# Patient Record
Sex: FEMALE | Race: WHITE | NOT HISPANIC OR LATINO | Employment: UNEMPLOYED | URBAN - METROPOLITAN AREA
[De-identification: names, ages, dates, MRNs, and addresses within clinical notes are randomized per-mention and may not be internally consistent; named-entity substitution may affect disease eponyms.]

---

## 2022-05-25 ENCOUNTER — TRANSCRIBE ORDERS (OUTPATIENT)
Dept: PHYSICAL THERAPY | Facility: CLINIC | Age: 47
End: 2022-05-25

## 2022-05-25 DIAGNOSIS — Z98.1 S/P LUMBAR FUSION: Primary | ICD-10-CM

## 2022-07-05 ENCOUNTER — TREATMENT (OUTPATIENT)
Dept: PHYSICAL THERAPY | Facility: CLINIC | Age: 47
End: 2022-07-05

## 2022-07-05 DIAGNOSIS — Z98.1 S/P LUMBAR FUSION: Primary | ICD-10-CM

## 2022-07-05 PROCEDURE — 97163 PT EVAL HIGH COMPLEX 45 MIN: CPT | Performed by: PHYSICAL THERAPIST

## 2022-07-05 NOTE — PROGRESS NOTES
"Physical Therapy Initial Evaluation and Plan of Care    Patient: Bill [facility]oya[facility]   : 1975  Diagnosis/ICD-10 Code:  S/P lumbar fusion [Z98.1]  Referring practitioner: José Manuel Keen, *  Date of Initial Visit: 2022  Today's Date: 2022  Patient seen for 1 sessions           Subjective Questionnaire: 37/50 = 74%    Subjective Evaluation    Pain  Progression: worsening    Social Support  Lives in: trailer    Treatments  Previous treatment: physical therapy and home therapy  Patient Goals  Patient goals for therapy: decreased edema, decreased pain, improved balance, increased motion, increased strength, independence with ADLs/IADLs, return to sport/leisure activities and return to work  Patient goal: fishing, gardening, amusement palomino, go to store with kids, zoo trips       Pt chronic low back and leg symptoms, worse over time, \"to point I couldn't stand it.\" Has been worse since surgeries, still in major pain and has not subsided, weakness is worse.  She had surgery in  with land based therapy following. She had revision surgery 3/24/22. She has anterior/posterior fusion L5-S1, laminectomy, partial facetectomy and foraminotomy. She had home health, referred for aquatic therapy. She reports had MRI recently, shows fluid build up. MD follow up in 3 weeks. She reports prior to surgery could stand for short time and walk through grocery store. No she is limited to standing 2-3 mins with severe pain. A little better in sitting. Uses electric cart at grocery and kids help. Can drive short distances, kids take rollator in/out of car. Walking short distances with rollator, takes frequent breaks. Arms get really sore. W/c for longer distances. Legs spasm, buckling. 4 episodes of falling past year. Back hurts in center, lower back/sacrum. Bilat LE pain, whole leg, worse with standing and laying down, slightly better with elevation, hot/cold, medicine. Numbness down leg to foot, intermittent, " "frequent, worse with standing. Tingling more in R leg, whole leg, worse with standing. She has ramp into trailer. She has raised toilet set, shower chair, long handle brush, reacher. Gets help to wash her back and bottom of legs and feet, shoes and socks. Sleeps in hospital bed. Sleep is limited, stiff in AM. Sitting and laying a lot of the day due to pain, up intermittently. She reports intermittent black outs, legs give out, \"lightheaded and everything goes black.\" Has spoken with PCP and believed pain related. On 3 HTN meds due to high blood pressure.     Pain 9/10 (7/10 to 10/10) sharp, dull, burning.     Objective          Postural Observations  Seated posture: poor  Standing posture: poor    Additional Postural Observation Details  Rounded thoracic, forward head.    Standing: Flexed posture, flexed hips, knees, looking to floor, flexed trunk. Limited 2 mins with pain and onset of LE symptoms.    Sit to stand: max use of UE, slow transition to standing, unable to achieve full upright    Palpation   Left   Tenderness of the quadratus lumborum.     Right Tenderness of the quadratus lumborum.     Tenderness     Additional Tenderness Details  Incision healed no significant tenderness/hypersensitivity    Neurological Testing     Sensation     Lumbar   Left   Diminished: light touch    Right   Diminished: light touch    Active Range of Motion     Additional Active Range of Motion Details  Not tested due to post op  Unable to achieve neutral.     Hip ER functional AROM sitting: L 15, R 5 painful hip and low back    Shoulder AROM: Flex 95, abd, 95, ER each, IR low back with facial grimacing.    Strength/Myotome Testing     Left Hip   Planes of Motion   Flexion: 3- (painful back)  Abduction: 3  Adduction: 3    Right Hip   Planes of Motion   Flexion: 3- (painful back)  Abduction: 3  Adduction: 3    Left Knee   Flexion: 3- (painful knee)  Extension: 2+ (painful knee)    Right Knee   Flexion: 3- (painful weak)  Extension: " 2+ (painful, weak)    Left Ankle/Foot   Dorsiflexion: 3-  Plantar flexion: 3  Inversion: 3  Eversion: 3  Great toe extension: 3-    Right Ankle/Foot   Dorsiflexion: 3-  Plantar flexion: 3-  Inversion: 3-  Eversion: 3-  Great toe extension: 2+    Additional Strength Details  Painful, limited movement all directions. Can wiggle toes, limited ROM.          Muscle Activation     Additional Muscle Activation Details  Weak TA     General Comments     Lumbar Comments  Ambulation: slow antalgic pattern with flexed posture, rollator. Mod support through UE. Elbows significantly flexed. Short stride length, limited hip ext, limited push off. 100 ft. Stop x 2 to sit on rollator seat.    Standing: balance good with UE handhold. Fair without handhold.    Unable to examine sidelying/supine/hooklying today. Limited to sitting and standing only today due to pain and positional tolerance.       Assessment & Plan     Assessment  Impairments: abnormal coordination, abnormal gait, abnormal muscle firing, abnormal muscle tone, abnormal or restricted ROM, activity intolerance, impaired balance, impaired physical strength, lacks appropriate home exercise program, pain with function and weight-bearing intolerance  Functional Limitations: carrying objects, lifting, sleeping, walking, pulling, pushing, uncomfortable because of pain, moving in bed, sitting, standing and stooping  Assessment details: Patient presents with significant impairments, including pain; decreased mobility; impaired gait; decreased ROM and strength. Based on the above impairments and the examination, this patient has the potential to benefit from Physical Therapy. Will initiate principles of aquatic therapy to offload spine/joints, thermal effects to reduce pain, and hydrostatic pressure to facilitate exercise with transition to land as tolerated.    Prognosis: good    Goals  Plan Goals: STG's (4 weeks)  1. Tolerate 45 minutes aquatic therapy with reduction in pain.    2. Able to ambulate x 10 min in aquatic setting with improved gait pattern.  3. Able to tolerate initial HEP w/ progression.  4. Set rollator to appropriate height  5. Increase LE strength 1/2 muscle grade for sit to stand and transfers.   6. Pt to stand 10 mins prior to seated rest break.    LTG's (by d/c)  1. Pt to demonstrate hip ER AROM 50 degrees bilat for don/dof shoes  2. Patient voices readiness for discharge to independent HEP and self management of symptoms  3. Decrease overall pain 50% or more   4. Significant improvement on Oswestry for increased functional tolerance  5. Pt to walk 600 ft with least restrictive AD for shopping.   6. Pt to stand 20 mins prior to sitting rest break by D/C        Plan  Therapy options: will be seen for skilled therapy services  Planned modality interventions: electrical stimulation/Russian stimulation, cryotherapy, thermotherapy (hydrocollator packs), traction, ultrasound and dry needling  Planned therapy interventions: joint mobilization, home exercise program, gait training, functional ROM exercises, flexibility, manual therapy, neuromuscular re-education, postural training, soft tissue mobilization, spinal/joint mobilization, strengthening, stretching, therapeutic activities, abdominal trunk stabilization, body mechanics training, transfer training and balance/weight-bearing training  Other planned therapy interventions: aquatic  Frequency: 2x week  Duration in visits: 20  Treatment plan discussed with: patient          History # of Personal Factors and/or Comorbidities: HIGH (3+)  Examination of Body System(s): # of elements: HIGH (4+)  Clinical Presentation: EVOLVING  Clinical Decision Making: HIGH    Timed:         Manual Therapy:         mins  83033;     Therapeutic Exercise:         mins  41014;     Neuromuscular Williams:        mins  22497;    Therapeutic Activity:          mins  90222;     Gait Training:           mins  97561;     Ultrasound:          mins  31512;     Ionto                                  mins   12850  Self Care                            mins   17090  Aquatic                               mins 51745      Un-Timed:  Electrical Stimulation:         mins  48471 ( );  Dry Needling          mins self-pay  Traction          mins 19856  Low Eval          Mins  59488  Mod Eval    40   Mins  66482  High Eval                            Mins  51506  Re-Eval                               mins  60333        Timed Treatment:      mins   Total Treatment:     40   mins    PT SIGNATURE: Stephania Galarza, PT   DATE TREATMENT INITIATED: 7/7/2022    Initial Certification  Certification Period: 10/5/2022  I certify that the therapy services are furnished while this patient is under my care.  The services outlined above are required by this patient, and will be reviewed every 90 days.     PHYSICIAN: José Manuel Keen MD      DATE:     Please sign and return via fax to [phone].. Thank you, [facility].

## 2022-07-14 ENCOUNTER — TREATMENT (OUTPATIENT)
Dept: PHYSICAL THERAPY | Facility: CLINIC | Age: 47
End: 2022-07-14

## 2022-07-14 DIAGNOSIS — Z98.1 S/P LUMBAR FUSION: Primary | ICD-10-CM

## 2022-07-14 PROCEDURE — 97113 AQUATIC THERAPY/EXERCISES: CPT | Performed by: PHYSICAL THERAPIST

## 2022-07-14 NOTE — PROGRESS NOTES
Physical Therapy Treatment Note    VISIT#: 2     Diagnosis Plan   1. S/P lumbar fusion       Subjective   Bill Bedoya-Quintin reports: Pain 8/10. Back and R leg to toes. Pain in hip/groin when she stands. Comfortable with water.  Frustrated she still has as much pain and symptoms and difficulty walking after her surgery. Has machine similar to nustep at home.      Objective     See Exercise, Manual, and Modality Logs for complete treatment.     Pt 15 mins late accommodated  Rollator for ambulation    Chair to enter/exit    Assessment/Plan Very light intensity with rest breaks between exercise to avoid overfatigue. Pt reports no increased pain with any specific exercise but cumulatively increased to 9/10 by end of session. Some relief with noodle distraction.       Progress per Plan of Care Monitor and progress as tolerated. HEP in next 2 visits.            Timed:         Manual Therapy:         mins  60917;     Therapeutic Exercise:         mins  57219;     Neuromuscular Williams:        mins  11706;    Therapeutic Activity:          mins  28872;     Gait Training:           mins  84991;     Ultrasound:          mins  57307;    Ionto                                   mins   19101  Self Care                            mins   93377  Canalith Repos                   mins  4209  Aquatic                          40     mins 60670    Un-Timed:  Electrical Stimulation:         mins  10659 ( );  Dry Needling          mins self-pay  Traction          mins 04529  Low Eval          Mins  21068  Mod Eval          Mins  99053  High Eval                            Mins  62685  Re-Eval                               mins  77052    Timed Treatment:   40   mins   Total Treatment:     40   mins    Stephania Galarza, PT

## 2022-07-19 ENCOUNTER — TREATMENT (OUTPATIENT)
Dept: PHYSICAL THERAPY | Facility: CLINIC | Age: 47
End: 2022-07-19

## 2022-07-19 DIAGNOSIS — Z98.1 S/P LUMBAR FUSION: Primary | ICD-10-CM

## 2022-07-19 PROCEDURE — 97113 AQUATIC THERAPY/EXERCISES: CPT | Performed by: PHYSICAL THERAPIST

## 2022-07-19 NOTE — PROGRESS NOTES
Physical Therapy Treatment Note    VISIT#: 3     Diagnosis Plan   1. S/P lumbar fusion       Subjective   Tosca Nettell-Quintin reports: was tired after first visit but did ok. 7/10 back and leg.     Objective     See Exercise, Manual, and Modality Logs for complete treatment.     Patient Education: reviewed TA for HEP.  Aquatic via chair.    Assessment/Plan Decreased pain back and leg in aquatic setting slightly to 6/10 in distraction position.       Progress per Plan of Care Monitor and progress as tolerated.            Timed:         Manual Therapy:         mins  78734;     Therapeutic Exercise:         mins  21785;     Neuromuscular Williams:        mins  40727;    Therapeutic Activity:          mins  57813;     Gait Training:           mins  52094;     Ultrasound:          mins  39607;    Ionto                                   mins   24948  Self Care                            mins   61141  Canalith Repos                   mins  4209  Aquatic                          40    mins 64037    Un-Timed:  Electrical Stimulation:         mins  31534 ( );  Dry Needling          mins self-pay  Traction          mins 22736  Low Eval          Mins  59933  Mod Eval          Mins  25197  High Eval                            Mins  08144  Re-Eval                               mins  92661    Timed Treatment:   40   mins   Total Treatment:     40   mins    Stephania Galarza, PT

## 2022-07-21 ENCOUNTER — TREATMENT (OUTPATIENT)
Dept: PHYSICAL THERAPY | Facility: CLINIC | Age: 47
End: 2022-07-21

## 2022-07-21 DIAGNOSIS — Z98.1 S/P LUMBAR FUSION: Primary | ICD-10-CM

## 2022-07-21 PROCEDURE — 97113 AQUATIC THERAPY/EXERCISES: CPT | Performed by: PHYSICAL THERAPIST

## 2022-07-21 NOTE — PROGRESS NOTES
Physical Therapy Treatment Note    VISIT#: 4     Diagnosis Plan   1. S/P lumbar fusion       Subjective   Tosca Percell-Quintin reports: got a new rollator with UE platforms, makes her stand up straighter. In a little more pain today, woke up feeling a little better but did some gardening this AM, overdid it. Wants exercise for abductors.      Objective     See Exercise, Manual, and Modality Logs for complete treatment.     Aquatic via chair.    Assessment/Plan Very mild progression of reps of seated exercises. Rest break between to avoid overfatigue.      Progress per Plan of Care Gentle hip flexor/gastroc stretch next visit.            Timed:         Manual Therapy:         mins  17586;     Therapeutic Exercise:         mins  97725;     Neuromuscular Williams:        mins  50945;    Therapeutic Activity:          mins  83083;     Gait Training:           mins  49838;     Ultrasound:          mins  64283;    Ionto                                   mins   71989  Self Care                            mins   55190  Canalith Repos                   mins  4209  Aquatic                          43     mins 94352    Un-Timed:  Electrical Stimulation:         mins  58701 ( );  Dry Needling          mins self-pay  Traction          mins 26874  Low Eval          Mins  13452  Mod Eval          Mins  48362  High Eval                            Mins  43820  Re-Eval                               mins  19824    Timed Treatment:   43   mins   Total Treatment:     43   mins    Stephania Galarza, PT

## 2022-07-26 ENCOUNTER — TELEPHONE (OUTPATIENT)
Dept: PHYSICAL THERAPY | Facility: CLINIC | Age: 47
End: 2022-07-26

## 2022-07-28 ENCOUNTER — TREATMENT (OUTPATIENT)
Dept: PHYSICAL THERAPY | Facility: CLINIC | Age: 47
End: 2022-07-28

## 2022-07-28 DIAGNOSIS — Z98.1 S/P LUMBAR FUSION: Primary | ICD-10-CM

## 2022-07-28 PROCEDURE — 97113 AQUATIC THERAPY/EXERCISES: CPT | Performed by: PHYSICAL THERAPIST

## 2022-07-28 NOTE — PROGRESS NOTES
"Physical Therapy Treatment Note    VISIT#: 5     Diagnosis Plan   1. S/P lumbar fusion       Subjective   Tosca Percell-Quintin reports: 9/10, very active this AM, lots of doctors appts.    Objective     See Exercise, Manual, and Modality Logs for complete treatment.     Patient Education: avoid leaning on forearm walker, encourage upright posture. Encourage frequent short walks throughout the day, \"do a little bit rest a little bit\" to build up tolerance.     Aquatic via chair.    Assessment/Plan Pain decreased to 8/10 in aquatic setting. Mildly improved exercise tolerance today. Able to progress gait on treadmill 1 min and pt demonstrated longer stride length.      Progress per Plan of Care HEP hooklying and sitting.  2    Timed:         Manual Therapy:         mins  26215;     Therapeutic Exercise:         mins  93264;     Neuromuscular Williams:        mins  16041;    Therapeutic Activity:          mins  87448;     Gait Training:           mins  73213;     Ultrasound:          mins  94736;    Ionto                                   mins   81510  Self Care                            mins   33034  Canalith Repos                   mins  4209  Aquatic                          45     mins 64886    Un-Timed:  Electrical Stimulation:         mins  22988 ( );  Dry Needling          mins self-pay  Traction          mins 36534  Low Eval          Mins  21010  Mod Eval          Mins  94947  High Eval                            Mins  05127  Re-Eval                               mins  54249    Timed Treatment:   45   mins   Total Treatment:     45   mins    Stephania Galarza PT  "

## 2022-08-02 ENCOUNTER — TREATMENT (OUTPATIENT)
Dept: PHYSICAL THERAPY | Facility: CLINIC | Age: 47
End: 2022-08-02

## 2022-08-02 DIAGNOSIS — Z98.1 S/P LUMBAR FUSION: Primary | ICD-10-CM

## 2022-08-02 PROCEDURE — 97113 AQUATIC THERAPY/EXERCISES: CPT | Performed by: PHYSICAL THERAPIST

## 2022-08-02 NOTE — PROGRESS NOTES
Physical Therapy Treatment Note    VISIT#: 6     Diagnosis Plan   1. S/P lumbar fusion       Subjective   Tosca Percell-Quintin reports: Not a good day with the rain coming in. Able to straighten nee a little better. Trying to work on heel-toe gait.      Objective     See Exercise, Manual, and Modality Logs for complete treatment.     Aquatic via chair  Platform rollator for walking     Pt demonstrating mildly improved upright posture.    Assessment/Plan Pt reports some relief with lumbar distraction position. Progression limited by pain. Pt reports feels a little better post treatment, tired but less stiff.    Progress per Plan of Care Progress HEP.            Timed:         Manual Therapy:         mins  23179;     Therapeutic Exercise:         mins  80414;     Neuromuscular Williams:        mins  38420;    Therapeutic Activity:          mins  77104;     Gait Training:           mins  03007;     Ultrasound:          mins  62450;    Ionto                                   mins   57954  Self Care                            mins   84506  Canalith Repos                   mins  4209  Aquatic                          47     mins 21649    Un-Timed:  Electrical Stimulation:         mins  04177 ( );  Dry Needling          mins self-pay  Traction          mins 38585  Low Eval          Mins  27139  Mod Eval          Mins  49350  High Eval                            Mins  04564  Re-Eval                               mins  17894    Timed Treatment:   47   mins   Total Treatment:     47   mins    Stephania Galarza, PT

## 2022-08-04 ENCOUNTER — TREATMENT (OUTPATIENT)
Dept: PHYSICAL THERAPY | Facility: CLINIC | Age: 47
End: 2022-08-04

## 2022-08-04 DIAGNOSIS — Z98.1 S/P LUMBAR FUSION: Primary | ICD-10-CM

## 2022-08-04 PROCEDURE — 97113 AQUATIC THERAPY/EXERCISES: CPT | Performed by: PHYSICAL THERAPIST

## 2022-08-04 NOTE — PROGRESS NOTES
"Physical Therapy Treatment Note    VISIT#: 7     Diagnosis Plan   1. S/P lumbar fusion       Subjective   Tosca Nettell-Quintin reports: feeling a little better today. A little less swelling in her feet and ankles. Leg pain more localized to anterior thigh and groin. Less in lower leg and less in back of leg than she used to. She can tolerate exercise and standing a lot better in aquatic setting than out of aquatic setting at this point. She feels like she is a little bit \"bow legged and walk on side of R foot    Objective     See Exercise, Manual, and Modality Logs for complete treatment.     Aquatic via chair    Access Code: [email or web address]  URL: [email or web address]  Date: 08/04/2022  Prepared by: Stephania Galarza    Exercises  Seated Transversus Abdominis Bracing - 1 x daily - 7 x weekly - 2 sets - 10 reps - 5\" hold  Seated Long Arc Quad - 1 x daily - 7 x weekly - 2 sets - 10 reps  Seated Ankle Pumps - 1 x daily - 7 x weekly - 2 sets - 10 reps    Pt reports episode of R knee popping while turning with rolling walker exiting clinic.   Pt does ambulate on lateral border foot on R, demonstrate calcaneal inversion R>L, mild genu varus.     Assessment/Plan Pt with consistent relief with noodle distraction position. Making slow progress, very slow progression, but able to perform exercises with mild increase in ROM and less facial grimacing. Centralized more to anterior thigh and groin.      Progress per Plan of Care Monitior and progress as tolerated. Add gentle hip flexor, gentle quad stretch.             Timed:         Manual Therapy:         mins  60010;     Therapeutic Exercise:         mins  02684;     Neuromuscular Williams:        mins  02103;    Therapeutic Activity:          mins  97139;     Gait Training:           mins  69418;     Ultrasound:          mins  50699;    Ionto                                   mins   22422  Self Care                            mins   90827  Canalith Repos                   mins  " 4209  Aquatic                          45    mins 67274    Un-Timed:  Electrical Stimulation:         mins  04775 ( );  Dry Needling         mins self-pay  Traction          mins 51669  Low Eval          Mins  46439  Mod Eval          Mins  82133  High Eval                            Mins  07222  Re-Eval                               mins  68709    Timed Treatment:   45   mins   Total Treatment:     45   mins    Stephania Galarza, PT

## 2022-08-09 ENCOUNTER — TREATMENT (OUTPATIENT)
Dept: PHYSICAL THERAPY | Facility: CLINIC | Age: 47
End: 2022-08-09

## 2022-08-09 DIAGNOSIS — Z98.1 S/P LUMBAR FUSION: Primary | ICD-10-CM

## 2022-08-09 PROCEDURE — 97113 AQUATIC THERAPY/EXERCISES: CPT | Performed by: PHYSICAL THERAPIST

## 2022-08-09 NOTE — PROGRESS NOTES
Physical Therapy Treatment Note    VISIT#: 8     Diagnosis Plan   1. S/P lumbar fusion       Subjective   Bill Elke-Quintin reports: Doing fine with seated HEP. Pain 7/10 currently.     Objective     See Exercise, Manual, and Modality Logs for complete treatment.     Aquatic via chair.    1 episode audible popping R knee, pt reports bone on bone arthritis knee, going to get new xrays. Not sure if arthritis in hip, no xrays.    Assessment/Plan Added gentle quad stretch, also encourage hip ext with hip scissor exercise. Less leg burning with distraction position. Encourage longer stride length, mild hip ext which she was able to perform but limited by R anterior thigh pain at 5 min.  Decreased again with distraction.    Progress per Plan of Care            Timed:         Manual Therapy:         mins  84265;     Therapeutic Exercise:         mins  92157;     Neuromuscular Williams:        mins  11918;    Therapeutic Activity:          mins  01437;     Gait Training:           mins  55568;     Ultrasound:          mins  50915;    Ionto                                   mins   44840  Self Care                            mins   84045  Canalith Repos                   mins  4209  Aquatic                          50     mins 25489    Un-Timed:  Electrical Stimulation:         mins  39152 ( );  Dry Needling          mins self-pay  Traction          mins 06585  Low Eval          Mins  57258  Mod Eval          Mins  70972  High Eval                            Mins  16173  Re-Eval                               mins  44998    Timed Treatment:   50   mins   Total Treatment:     50   mins    Stephania Galarza, PT

## 2022-08-11 ENCOUNTER — TREATMENT (OUTPATIENT)
Dept: PHYSICAL THERAPY | Facility: CLINIC | Age: 47
End: 2022-08-11

## 2022-08-11 DIAGNOSIS — Z98.1 S/P LUMBAR FUSION: Primary | ICD-10-CM

## 2022-08-11 PROCEDURE — 97113 AQUATIC THERAPY/EXERCISES: CPT | Performed by: PHYSICAL THERAPIST

## 2022-08-11 NOTE — PROGRESS NOTES
Physical Therapy Treatment Note    VISIT#: 9     Diagnosis Plan   1. S/P lumbar fusion       Subjective   Tosca Percell-Quintin reports: 8/10 central low back, R groin and anterior thigh. Denies buttock pain. Md follow up with back surgeon in beginning of sept. Sees ortho for knee next week, getting xrays done today.      Objective     See Exercise, Manual, and Modality Logs for complete treatment.     Aquatic via chair    R hip assessed post aquatic. Hip flexor painful active hp flex sitting and painful advancing LE in supine position. Decreased pain with lumbar distraction and R LE long axis distraction. Advised pt to ice hip flexor and she plans to discuss with ortho next week if needs hip xray to assess for arthritis.    Assessment/Plan pt chose to increase reps slightly, also longer treadmill ambulation. Good carry over of long stride length.        Progress per Plan of Care Add diagonal weight shift, hip flexor stretching.             Timed:         Manual Therapy:         mins  36942;     Therapeutic Exercise:         mins  66475;     Neuromuscular Williams:        mins  11813;    Therapeutic Activity:          mins  32330;     Gait Training:           mins  05049;     Ultrasound:          mins  96878;    Ionto                                   mins   70179  Self Care                            mins   56687  Canalith Repos                   mins  4209  Aquatic                          45     mins 68431    Un-Timed:  Electrical Stimulation:         mins  44931 ( );  Dry Needling          mins self-pay  Traction          mins 14992  Low Eval          Mins  96265  Mod Eval          Mins  40723  High Eval                            Mins  83361  Re-Eval                               mins  90154    Timed Treatment:   45   mins   Total Treatment:     45   mins    Stephania Galarza, PT

## 2022-08-18 ENCOUNTER — TREATMENT (OUTPATIENT)
Dept: PHYSICAL THERAPY | Facility: CLINIC | Age: 47
End: 2022-08-18

## 2022-08-18 DIAGNOSIS — Z98.1 S/P LUMBAR FUSION: Primary | ICD-10-CM

## 2022-08-18 PROCEDURE — 97113 AQUATIC THERAPY/EXERCISES: CPT | Performed by: PHYSICAL THERAPIST

## 2022-08-18 NOTE — PROGRESS NOTES
Physical Therapy Treatment Note    VISIT#: 10     Diagnosis Plan   1. S/P lumbar fusion       Subjective   Tosca Percell-Quintin reports: 7/10 central low back, R groin and anterior thigh. Next MD visit: knees tomorrow; back MD next month. No change since surgery.    Objective     See Exercise, Manual, and Modality Logs for complete treatment.     Aquatic via chair      Assessment/Plan - intermittent breaks with vertical distraction b/w exercise to alleviate pressure on spine. Added gentle hip flexors stretch on step in deep end.       Progress per Plan of Care Add diagonal weight shifting.             Timed:         Manual Therapy:         mins  46064;     Therapeutic Exercise:         mins  14294;     Neuromuscular Williams:        mins  47807;    Therapeutic Activity:          mins  12224;     Gait Training:           mins  99715;     Ultrasound:          mins  45090;    Ionto                                   mins   34094  Self Care                            mins   99987  Canalith Repos                   mins  4209  Aquatic                          45     mins 93265    Un-Timed:  Electrical Stimulation:         mins  20788 ( );  Dry Needling          mins self-pay  Traction          mins 17775  Low Eval          Mins  15834  Mod Eval          Mins  03523  High Eval                            Mins  62272  Re-Eval                               mins  35609    Timed Treatment:   45   mins   Total Treatment:     45   mins    Bobby Mauro, PT

## 2022-08-23 ENCOUNTER — TREATMENT (OUTPATIENT)
Dept: PHYSICAL THERAPY | Facility: CLINIC | Age: 47
End: 2022-08-23

## 2022-08-23 DIAGNOSIS — Z98.1 S/P LUMBAR FUSION: Primary | ICD-10-CM

## 2022-08-23 PROCEDURE — 97113 AQUATIC THERAPY/EXERCISES: CPT | Performed by: PHYSICAL THERAPIST

## 2022-08-23 NOTE — PROGRESS NOTES
"Physical Therapy Progress Note    VISIT#: 11     Diagnosis Plan   1. S/P lumbar fusion       Subjective   Tosca Percell-Quintin reports: got knee xrays at [facility], bone on bone arthritis. Got shots at MD appt, helping some. Also got hip xrays, R shows more arthritis than L. MD wants her to continue therapy and follow up in 3 months. Doesn't want to do surgery right now so close to back surgery. Hip flexor stretch helped last visit some. Can stand ~ 3 mins with walker. Still difficutly getting shoes on, especially R side.  Getting up and down from chair easier. Walking better with better posture, more upright, less leaning on UE, able to walk a little further ~ \"from one end of the building to the other with new walker before have to sit down due to pain\" which is improvement. Back 6/10 to 10/10, today 6/10. Hip R 6-7/10.      Objective    Pt 10 mins late on arrival.   Oswestry: pt forgot to fill out before she left, has with her to bring back next visit.    See Exercise, Manual, and Modality Logs for complete treatment.     Patient Education: discussed hip flexor stretches for home.     Pt able to stand with improved upright posture.     Assessment/Plan Pt able to tolerated mild progression of exercises. Less walking time but mild increase in walking speed. Continues to make slow progress toward functional goals. Hip and knee also contributing to limited function.    1. Tolerate 45 minutes aquatic therapy with reduction in pain. - MET  2. Able to ambulate x 10 min in aquatic setting with improved gait pattern. - MET  3. Able to tolerate initial HEP w/ progression. - MET  4. Set rollator to appropriate height- MET  5. Increase LE strength 1/2 muscle grade for sit to stand and transfers. - PARTIALLY MET   6. Pt to stand 10 mins prior to seated rest break. - NOT MET    LTG's (by d/c)  1. Pt to demonstrate hip ER AROM 50 degrees bilat for don/dof shoes - NOT MET  2. Patient voices readiness for discharge to " independent HEP and self management of symptoms - NOT MET  3. Decrease overall pain 50% or more - PARTIALLY MET  4. Significant improvement on Oswestry for increased functional tolerance  5. Pt to walk 600 ft with least restrictive AD for shopping. - PARTIALLY MET  6. Pt to stand 20 mins prior to sitting rest break by D/C - NOT MET      Progress per Plan of Care Hip HEP next visit.            Timed:         Manual Therapy:         mins  72812;     Therapeutic Exercise:         mins  64687;     Neuromuscular Williams:        mins  53571;    Therapeutic Activity:          mins  62874;     Gait Training:           mins  41760;     Ultrasound:          mins  97078;    Ionto                                   mins   86910  Self Care                            mins   17606  Canalith Repos                   mins  4209  Aquatic                          43     mins 36382    Un-Timed:  Electrical Stimulation:         mins  06304 ( );  Dry Needling          mins self-pay  Traction          mins 51762  Low Eval          Mins  99736  Mod Eval          Mins  29470  High Eval                            Mins  54820  Re-Eval                               mins  34213    Timed Treatment:   43   mins   Total Treatment:     43   mins    Stephania Galarza PT

## 2022-08-25 ENCOUNTER — TREATMENT (OUTPATIENT)
Dept: PHYSICAL THERAPY | Facility: CLINIC | Age: 47
End: 2022-08-25

## 2022-08-25 DIAGNOSIS — Z98.1 S/P LUMBAR FUSION: Primary | ICD-10-CM

## 2022-08-25 PROCEDURE — 97113 AQUATIC THERAPY/EXERCISES: CPT | Performed by: PHYSICAL THERAPIST

## 2022-08-25 NOTE — PROGRESS NOTES
Physical Therapy Treatment Note    VISIT#: 12     Diagnosis Plan   1. S/P lumbar fusion       Subjective   Tosca Percell-Quintin reports: pt reports forgot to bring oswestry will bring next visit. No new issues.      Objective     See Exercise, Manual, and Modality Logs for complete treatment.     Patient Education:    Access Code: [email or web address]  URL: [email or web address]  Date: 08/25/2022  Prepared by: Stephania Galarza    Exercises  Standing Hip Flexor Stretch - 1 x daily - 7 x weekly - 3 sets - 1 reps - 20 sec hold  Bent Knee Fallouts - 1 x daily - 7 x weekly - 2 sets - 10 reps  Supine Hip External Rotation Stretch - 1 x daily - 7 x weekly - 3 sets - 1 reps - 20 sec hold  Supine Heel Slide with Strap - 1 x daily - 7 x weekly - 1 sets - 10 reps  Seated Knee Flexion Slide - 1 x daily - 7 x weekly - 3 sets - 1 reps - 20 sec hold  Supine Bridge - 1 x daily - 7 x weekly - 3 sets - 10 reps    Assessment/Plan Mild progression of treadmill ambulation time, progressed speed last visit.    Progress per Plan of Care 2 remaining visits in current auth. Hip circles next visit              Timed:         Manual Therapy:         mins  94954;     Therapeutic Exercise:         mins  38955;     Neuromuscular Williams:        mins  81286;    Therapeutic Activity:          mins  24377;     Gait Training:           mins  43249;     Ultrasound:          mins  41173;    Ionto                                   mins   18550  Self Care                            mins   77412  Canalith Repos                   mins  4209  Aquatic                          45   mins 51812    Un-Timed:  Electrical Stimulation:         mins  46667 ( );  Dry Needling          mins self-pay  Traction          mins 13413  Low Eval          Mins  77687  Mod Eval          Mins  22649  High Eval                            Mins  75737  Re-Eval                               mins  85621    Timed Treatment:   45   mins   Total Treatment:     45   mins    Stephania Galarza,  PT

## 2022-08-30 ENCOUNTER — TREATMENT (OUTPATIENT)
Dept: PHYSICAL THERAPY | Facility: CLINIC | Age: 47
End: 2022-08-30

## 2022-08-30 DIAGNOSIS — Z98.1 S/P LUMBAR FUSION: Primary | ICD-10-CM

## 2022-08-30 PROCEDURE — 97113 AQUATIC THERAPY/EXERCISES: CPT | Performed by: PHYSICAL THERAPIST

## 2022-08-30 NOTE — PROGRESS NOTES
Physical Therapy Treatment Note    VISIT#: 13     Diagnosis Plan   1. S/P lumbar fusion       Subjective   Tosca Percell-Quintin reports: no new issues. Lost HEP. Leg doesn't buckle as much but still occasionally.    Objective     Oswestry 36/50 - 72%    See Exercise, Manual, and Modality Logs for complete treatment.     Patient Education: reprinted and texted exercises.     Assessment/Plan Added diagonal weight shift. Overall less guarded movement.       Progress per Plan of Care Add soleus stretch, TA + UE next visit. Reassess next visit.          Timed:         Manual Therapy:         mins  11996;     Therapeutic Exercise:         mins  33378;     Neuromuscular Williams:        mins  56152;    Therapeutic Activity:          mins  98239;     Gait Training:           mins  91498;     Ultrasound:          mins  89109;    Ionto                                   mins   97256  Self Care                            mins   03948  Canalith Repos                   mins  4209  Aquatic                          48     mins 54518    Un-Timed:  Electrical Stimulation:         mins  63102 ( );  Dry Needling          mins self-pay  Traction          mins 35306  Low Eval          Mins  53428  Mod Eval          Mins  21923  High Eval                            Mins  42758  Re-Eval                               mins  56686    Timed Treatment:   48   mins   Total Treatment:     48   mins    Stephania Galarza, PT

## 2022-10-04 ENCOUNTER — TRANSCRIBE ORDERS (OUTPATIENT)
Dept: ADMINISTRATIVE | Facility: HOSPITAL | Age: 47
End: 2022-10-04

## 2022-10-04 DIAGNOSIS — Z02.71 ENCOUNTER FOR DISABILITY DETERMINATION: Primary | ICD-10-CM

## 2022-10-17 ENCOUNTER — DOCUMENTATION (OUTPATIENT)
Dept: PHYSICAL THERAPY | Facility: CLINIC | Age: 47
End: 2022-10-17

## 2022-10-17 ENCOUNTER — TELEPHONE (OUTPATIENT)
Dept: PHYSICAL THERAPY | Facility: CLINIC | Age: 47
End: 2022-10-17

## 2022-10-17 NOTE — TELEPHONE ENCOUNTER
Left VM with requesting pt call back to discuss status. She at last telephone conversation she requested to be on hold 1 week, never called back. Will D/C pt at this time, last visit was 8/30/22. Glad to help with exercises in the meantime, give info for [facility], or start pt back up for PT with a new MD referral when she is ready.

## 2022-10-17 NOTE — PROGRESS NOTES
Discharge Summary  Discharge Summary from Physical Therapy Report    Bill Ribeiro  [birth year]    Dates  PT visit: 7/5/22 to 8/30/22  Number of Visits: 13    Discharge Status of Patient: pt had been attending therapy pretty consistently and tolerating with very slow progression due to pain level and deconditioning. She had xrays knees and hips at orthopedic, showing bone on bone knees and R>L hip arthritis. She plans to follow up in 3 months to discuss possible surgery. Her last PT treatment was 8/30/22, with cancellations following due to other health concerns. She asked to be put on hold 1 week, she did not call to reschedule. D/C per clinic attendance policy at this time.     Goals: Partially Met    Discharge Plan: Continue with current home exercise program as instructed  Future need for rehabilitation activities  Patient to return to referring/providing physician    Comments D/C per clinic attendance policy at this time. She would benefit from skilled PT again in the future with a new MD referral when she is able to attend consistently again.    Date of Discharge 10/17/22        Stephania Galarza, PT  Physical Therapist

## 2022-10-18 ENCOUNTER — HOSPITAL ENCOUNTER (OUTPATIENT)
Dept: RESPIRATORY THERAPY | Facility: HOSPITAL | Age: 47
Discharge: HOME | End: 2022-10-18

## 2022-10-18 DIAGNOSIS — Z02.71 ENCOUNTER FOR DISABILITY DETERMINATION: ICD-10-CM

## 2022-10-18 PROCEDURE — 94010 BREATHING CAPACITY TEST: CPT

## 2024-03-05 ENCOUNTER — PRE-ADMISSION TESTING (OUTPATIENT)
Dept: PREADMISSION TESTING | Facility: HOSPITAL | Age: 49
End: 2024-03-05
Payer: MEDICAID

## 2024-03-05 ENCOUNTER — LAB (OUTPATIENT)
Dept: LAB | Facility: HOSPITAL | Age: 49
End: 2024-03-05
Payer: MEDICAID

## 2024-03-05 ENCOUNTER — HOSPITAL ENCOUNTER (OUTPATIENT)
Dept: CARDIOLOGY | Facility: HOSPITAL | Age: 49
Discharge: HOME | End: 2024-03-05
Payer: MEDICAID

## 2024-03-05 VITALS
HEIGHT: 64 IN | HEART RATE: 77 BPM | OXYGEN SATURATION: 95 % | BODY MASS INDEX: 40.29 KG/M2 | RESPIRATION RATE: 20 BRPM | SYSTOLIC BLOOD PRESSURE: 132 MMHG | DIASTOLIC BLOOD PRESSURE: 88 MMHG | TEMPERATURE: 98.4 F | WEIGHT: 236 LBS

## 2024-03-05 LAB
ABO GROUP BLD: NORMAL
ALBUMIN SERPL-MCNC: 4 G/DL (ref 3.5–5.2)
ANION GAP SERPL CALCULATED.3IONS-SCNC: 11.9 MMOL/L (ref 5–15)
BASOPHILS # BLD AUTO: 0.04 10*3/MM3 (ref 0–0.2)
BASOPHILS NFR BLD AUTO: 0.7 % (ref 0–1.5)
BLD GP AB SCN SERPL QL: NEGATIVE
BUN SERPL-MCNC: 10 MG/DL (ref 6–20)
BUN/CREAT SERPL: 10.2 (ref 7–25)
CALCIUM SPEC-SCNC: 9.1 MG/DL (ref 8.6–10.5)
CHLORIDE SERPL-SCNC: 105 MMOL/L (ref 98–107)
CO2 SERPL-SCNC: 24.1 MMOL/L (ref 22–29)
CREAT SERPL-MCNC: 0.98 MG/DL (ref 0.57–1)
DEPRECATED RDW RBC AUTO: 40.7 FL (ref 37–54)
EGFRCR SERPLBLD CKD-EPI 2021: 70.9 ML/MIN/1.73
EOSINOPHIL # BLD AUTO: 0.17 10*3/MM3 (ref 0–0.4)
EOSINOPHIL NFR BLD AUTO: 3.1 % (ref 0.3–6.2)
ERYTHROCYTE [DISTWIDTH] IN BLOOD BY AUTOMATED COUNT: 13 % (ref 12.3–15.4)
GLUCOSE SERPL-MCNC: 89 MG/DL (ref 65–99)
HBA1C MFR BLD: 5.6 % (ref 4.8–5.6)
HCT VFR BLD AUTO: 38.3 % (ref 34–46.6)
HGB BLD-MCNC: 12.2 G/DL (ref 12–15.9)
IMM GRANULOCYTES # BLD AUTO: 0.03 10*3/MM3 (ref 0–0.05)
IMM GRANULOCYTES NFR BLD AUTO: 0.5 % (ref 0–0.5)
LYMPHOCYTES # BLD AUTO: 2.48 10*3/MM3 (ref 0.7–3.1)
LYMPHOCYTES NFR BLD AUTO: 45.3 % (ref 19.6–45.3)
MCH RBC QN AUTO: 27.2 PG (ref 26.6–33)
MCHC RBC AUTO-ENTMCNC: 31.9 G/DL (ref 31.5–35.7)
MCV RBC AUTO: 85.5 FL (ref 79–97)
MONOCYTES # BLD AUTO: 0.4 10*3/MM3 (ref 0.1–0.9)
MONOCYTES NFR BLD AUTO: 7.3 % (ref 5–12)
MRSA DNA SPEC QL NAA+PROBE: NORMAL
NEUTROPHILS NFR BLD AUTO: 2.35 10*3/MM3 (ref 1.7–7)
NEUTROPHILS NFR BLD AUTO: 43.1 % (ref 42.7–76)
NRBC BLD AUTO-RTO: 0 /100 WBC (ref 0–0.2)
PLATELET # BLD AUTO: 234 10*3/MM3 (ref 140–450)
PMV BLD AUTO: 9.7 FL (ref 6–12)
POTASSIUM SERPL-SCNC: 4 MMOL/L (ref 3.5–5.2)
RBC # BLD AUTO: 4.48 10*6/MM3 (ref 3.77–5.28)
RH BLD: POSITIVE
SODIUM SERPL-SCNC: 141 MMOL/L (ref 136–145)
T&S EXPIRATION DATE: NORMAL
WBC NRBC COR # BLD AUTO: 5.47 10*3/MM3 (ref 3.4–10.8)

## 2024-03-05 PROCEDURE — 36415 COLL VENOUS BLD VENIPUNCTURE: CPT | Performed by: ORTHOPAEDIC SURGERY

## 2024-03-05 PROCEDURE — 83036 HEMOGLOBIN GLYCOSYLATED A1C: CPT | Performed by: ORTHOPAEDIC SURGERY

## 2024-03-05 PROCEDURE — 85025 COMPLETE CBC W/AUTO DIFF WBC: CPT | Performed by: ORTHOPAEDIC SURGERY

## 2024-03-05 PROCEDURE — 86850 RBC ANTIBODY SCREEN: CPT | Performed by: ORTHOPAEDIC SURGERY

## 2024-03-05 PROCEDURE — 87641 MR-STAPH DNA AMP PROBE: CPT

## 2024-03-05 PROCEDURE — 82040 ASSAY OF SERUM ALBUMIN: CPT | Performed by: ORTHOPAEDIC SURGERY

## 2024-03-05 PROCEDURE — 86900 BLOOD TYPING SEROLOGIC ABO: CPT

## 2024-03-05 PROCEDURE — 86901 BLOOD TYPING SEROLOGIC RH(D): CPT | Performed by: ORTHOPAEDIC SURGERY

## 2024-03-05 PROCEDURE — 93005 ELECTROCARDIOGRAM TRACING: CPT | Performed by: ORTHOPAEDIC SURGERY

## 2024-03-05 PROCEDURE — 80048 BASIC METABOLIC PNL TOTAL CA: CPT | Performed by: ORTHOPAEDIC SURGERY

## 2024-03-05 PROCEDURE — 86900 BLOOD TYPING SEROLOGIC ABO: CPT | Performed by: ORTHOPAEDIC SURGERY

## 2024-03-05 PROCEDURE — 86901 BLOOD TYPING SEROLOGIC RH(D): CPT

## 2024-03-05 RX ORDER — PANTOPRAZOLE SODIUM 40 MG/1
40 TABLET, DELAYED RELEASE ORAL DAILY
COMMUNITY

## 2024-03-05 RX ORDER — LAMOTRIGINE 100 MG/1
100 TABLET ORAL DAILY
COMMUNITY

## 2024-03-05 RX ORDER — PROMETHAZINE HYDROCHLORIDE 25 MG/1
25 TABLET ORAL EVERY 6 HOURS PRN
COMMUNITY

## 2024-03-05 RX ORDER — ALBUTEROL SULFATE 90 UG/1
2 AEROSOL, METERED RESPIRATORY (INHALATION) EVERY 4 HOURS PRN
COMMUNITY

## 2024-03-05 RX ORDER — CLONIDINE 0.2 MG/24H
1 PATCH, EXTENDED RELEASE TRANSDERMAL WEEKLY
COMMUNITY

## 2024-03-05 RX ORDER — TIZANIDINE 4 MG/1
4 TABLET ORAL EVERY 8 HOURS PRN
COMMUNITY

## 2024-03-05 RX ORDER — CLONAZEPAM 0.5 MG/1
0.5 TABLET ORAL 2 TIMES DAILY PRN
COMMUNITY

## 2024-03-05 RX ORDER — HYDROCODONE BITARTRATE AND ACETAMINOPHEN 7.5; 325 MG/1; MG/1
1 TABLET ORAL EVERY 6 HOURS PRN
COMMUNITY
End: 2024-03-11 | Stop reason: HOSPADM

## 2024-03-05 RX ORDER — ESCITALOPRAM OXALATE 20 MG/1
20 TABLET ORAL DAILY
COMMUNITY

## 2024-03-05 RX ORDER — AMLODIPINE BESYLATE 10 MG/1
10 TABLET ORAL DAILY
COMMUNITY

## 2024-03-05 RX ORDER — TRAZODONE HYDROCHLORIDE 50 MG/1
50 TABLET ORAL NIGHTLY
COMMUNITY

## 2024-03-05 RX ORDER — BUPROPION HYDROCHLORIDE 150 MG/1
450 TABLET ORAL DAILY
COMMUNITY

## 2024-03-05 RX ORDER — LISINOPRIL 20 MG/1
20 TABLET ORAL DAILY
COMMUNITY

## 2024-03-05 RX ORDER — LIDOCAINE 50 MG/G
1 PATCH TOPICAL EVERY 24 HOURS
COMMUNITY

## 2024-03-05 RX ORDER — ONDANSETRON 4 MG/1
4 TABLET, FILM COATED ORAL EVERY 8 HOURS PRN
COMMUNITY

## 2024-03-05 RX ORDER — MECLIZINE HYDROCHLORIDE 25 MG/1
25 TABLET ORAL 3 TIMES DAILY PRN
COMMUNITY

## 2024-03-05 RX ORDER — ERGOCALCIFEROL (VITAMIN D2) 10 MCG
400 TABLET ORAL DAILY
COMMUNITY

## 2024-03-05 RX ORDER — SUMATRIPTAN 50 MG/1
50 TABLET, FILM COATED ORAL
COMMUNITY

## 2024-03-05 RX ORDER — ROSUVASTATIN CALCIUM 20 MG/1
20 TABLET, COATED ORAL DAILY
COMMUNITY

## 2024-03-05 RX ORDER — GABAPENTIN 400 MG/1
400 CAPSULE ORAL 3 TIMES DAILY
COMMUNITY

## 2024-03-07 LAB
QT INTERVAL: 382 MS
QTC INTERVAL: 437 MS

## 2024-03-11 ENCOUNTER — ANESTHESIA EVENT (OUTPATIENT)
Dept: PERIOP | Facility: HOSPITAL | Age: 49
End: 2024-03-11
Payer: MEDICAID

## 2024-03-11 ENCOUNTER — ANESTHESIA (OUTPATIENT)
Dept: PERIOP | Facility: HOSPITAL | Age: 49
End: 2024-03-11
Payer: MEDICAID

## 2024-03-11 ENCOUNTER — APPOINTMENT (OUTPATIENT)
Dept: GENERAL RADIOLOGY | Facility: HOSPITAL | Age: 49
End: 2024-03-11
Payer: MEDICAID

## 2024-03-11 ENCOUNTER — HOSPITAL ENCOUNTER (OUTPATIENT)
Facility: HOSPITAL | Age: 49
Setting detail: HOSPITAL OUTPATIENT SURGERY
Discharge: HOME | End: 2024-03-11
Attending: ORTHOPAEDIC SURGERY | Admitting: ORTHOPAEDIC SURGERY
Payer: MEDICAID

## 2024-03-11 VITALS
OXYGEN SATURATION: 94 % | SYSTOLIC BLOOD PRESSURE: 122 MMHG | BODY MASS INDEX: 40.97 KG/M2 | WEIGHT: 240 LBS | RESPIRATION RATE: 13 BRPM | DIASTOLIC BLOOD PRESSURE: 85 MMHG | HEIGHT: 64 IN | HEART RATE: 98 BPM | TEMPERATURE: 97.4 F

## 2024-03-11 DIAGNOSIS — Z96.641 STATUS POST TOTAL HIP REPLACEMENT, RIGHT: Primary | ICD-10-CM

## 2024-03-11 LAB — B-HCG UR QL: NEGATIVE

## 2024-03-11 PROCEDURE — 25010000002 FENTANYL CITRATE (PF) 50 MCG/ML SOLUTION: Performed by: NURSE ANESTHETIST, CERTIFIED REGISTERED

## 2024-03-11 PROCEDURE — 25010000002 BUPIVACAINE IN DEXTROSE 0.75-8.25 % SOLUTION: Performed by: ANESTHESIOLOGY

## 2024-03-11 PROCEDURE — 94799 UNLISTED PULMONARY SVC/PX: CPT

## 2024-03-11 PROCEDURE — 94640 AIRWAY INHALATION TREATMENT: CPT

## 2024-03-11 PROCEDURE — 25010000002 DEXAMETHASONE SODIUM PHOSPHATE 20 MG/5ML SOLUTION 5 ML VIAL: Performed by: ORTHOPAEDIC SURGERY

## 2024-03-11 PROCEDURE — 25010000002 HYDROMORPHONE 1 MG/ML SOLUTION: Performed by: NURSE ANESTHETIST, CERTIFIED REGISTERED

## 2024-03-11 PROCEDURE — 25010000002 PROPOFOL 1000 MG/100ML EMULSION: Performed by: NURSE ANESTHETIST, CERTIFIED REGISTERED

## 2024-03-11 PROCEDURE — 25010000002 CEFAZOLIN PER 500 MG: Performed by: ORTHOPAEDIC SURGERY

## 2024-03-11 PROCEDURE — C1776 JOINT DEVICE (IMPLANTABLE): HCPCS | Performed by: ORTHOPAEDIC SURGERY

## 2024-03-11 PROCEDURE — 25010000002 EPINEPHRINE PER 0.1 MG: Performed by: ORTHOPAEDIC SURGERY

## 2024-03-11 PROCEDURE — 76000 FLUOROSCOPY <1 HR PHYS/QHP: CPT

## 2024-03-11 PROCEDURE — 25010000002 MAGNESIUM SULFATE PER 500 MG OF MAGNESIUM: Performed by: NURSE ANESTHETIST, CERTIFIED REGISTERED

## 2024-03-11 PROCEDURE — 25010000002 KETOROLAC TROMETHAMINE PER 15 MG: Performed by: ORTHOPAEDIC SURGERY

## 2024-03-11 PROCEDURE — 25010000002 LABETALOL 5 MG/ML SOLUTION: Performed by: NURSE ANESTHETIST, CERTIFIED REGISTERED

## 2024-03-11 PROCEDURE — 81025 URINE PREGNANCY TEST: CPT | Performed by: ORTHOPAEDIC SURGERY

## 2024-03-11 PROCEDURE — 73501 X-RAY EXAM HIP UNI 1 VIEW: CPT

## 2024-03-11 PROCEDURE — 94761 N-INVAS EAR/PLS OXIMETRY MLT: CPT

## 2024-03-11 PROCEDURE — 25010000002 HYDRALAZINE PER 20 MG: Performed by: NURSE ANESTHETIST, CERTIFIED REGISTERED

## 2024-03-11 PROCEDURE — 25010000002 FENTANYL CITRATE (PF) 100 MCG/2ML SOLUTION: Performed by: ANESTHESIOLOGY

## 2024-03-11 PROCEDURE — 25010000002 GLYCOPYRROLATE 0.2 MG/ML SOLUTION: Performed by: NURSE ANESTHETIST, CERTIFIED REGISTERED

## 2024-03-11 PROCEDURE — C1713 ANCHOR/SCREW BN/BN,TIS/BN: HCPCS | Performed by: ORTHOPAEDIC SURGERY

## 2024-03-11 PROCEDURE — 97162 PT EVAL MOD COMPLEX 30 MIN: CPT

## 2024-03-11 PROCEDURE — 25010000002 ONDANSETRON PER 1 MG: Performed by: NURSE ANESTHETIST, CERTIFIED REGISTERED

## 2024-03-11 PROCEDURE — 25810000003 LACTATED RINGERS PER 1000 ML: Performed by: ORTHOPAEDIC SURGERY

## 2024-03-11 PROCEDURE — 25010000002 ROPIVACAINE PER 1 MG: Performed by: ORTHOPAEDIC SURGERY

## 2024-03-11 PROCEDURE — 25010000002 MIDAZOLAM PER 1 MG: Performed by: NURSE ANESTHETIST, CERTIFIED REGISTERED

## 2024-03-11 DEVICE — FEMORAL HEAD Ø 28 SIZE L
Type: IMPLANTABLE DEVICE | Site: HIP | Status: FUNCTIONAL
Brand: MECTACER BIOLOX DELTA FEMORAL BALL HEAD

## 2024-03-11 DEVICE — BONE PREPARATION KIT
Type: IMPLANTABLE DEVICE | Site: HIP | Status: FUNCTIONAL
Brand: BIOPREP

## 2024-03-11 DEVICE — TOTL HIP DBL MOBL: Type: IMPLANTABLE DEVICE | Site: HIP | Status: FUNCTIONAL

## 2024-03-11 DEVICE — HC PE LINER 28 / DMD
Type: IMPLANTABLE DEVICE | Site: HIP | Status: FUNCTIONAL
Brand: DOUBLE MOBILITY LINER

## 2024-03-11 DEVICE — DOUBLE MOBILITY ACETABULAR SHELL Ø48
Type: IMPLANTABLE DEVICE | Site: HIP | Status: FUNCTIONAL
Brand: MPACT DOUBLE MOBILITY SHELLS

## 2024-03-11 DEVICE — WAX,BONE,NATURAL
Type: IMPLANTABLE DEVICE | Site: HIP | Status: FUNCTIONAL
Brand: MEDLINE INDUSTRIES

## 2024-03-11 DEVICE — AMISTEM C CEMENTED STEM STANDARD SIZE 0
Type: IMPLANTABLE DEVICE | Site: HIP | Status: FUNCTIONAL
Brand: AMISTEM C FEMORAL STEMS

## 2024-03-11 DEVICE — CMT BONE PALACOS R HI/VISC 1X40: Type: IMPLANTABLE DEVICE | Site: HIP | Status: FUNCTIONAL

## 2024-03-11 DEVICE — DEV WND/CLS CONTRL TISS STRATAFIX SYMM PDS PLS CTX 60CM VIL: Type: IMPLANTABLE DEVICE | Site: HIP | Status: FUNCTIONAL

## 2024-03-11 RX ORDER — ACETAMINOPHEN 500 MG
1000 TABLET ORAL ONCE
Status: COMPLETED | OUTPATIENT
Start: 2024-03-11 | End: 2024-03-11

## 2024-03-11 RX ORDER — OXYCODONE HYDROCHLORIDE 5 MG/1
5 TABLET ORAL ONCE AS NEEDED
Status: COMPLETED | OUTPATIENT
Start: 2024-03-11 | End: 2024-03-11

## 2024-03-11 RX ORDER — SODIUM CHLORIDE 0.9 % (FLUSH) 0.9 %
10 SYRINGE (ML) INJECTION AS NEEDED
Status: DISCONTINUED | OUTPATIENT
Start: 2024-03-11 | End: 2024-03-11 | Stop reason: HOSPADM

## 2024-03-11 RX ORDER — BUPIVACAINE HYDROCHLORIDE 7.5 MG/ML
INJECTION, SOLUTION INTRASPINAL AS NEEDED
Status: DISCONTINUED | OUTPATIENT
Start: 2024-03-11 | End: 2024-03-11 | Stop reason: SURG

## 2024-03-11 RX ORDER — DEXAMETHASONE SODIUM PHOSPHATE 4 MG/ML
20 INJECTION, SOLUTION INTRA-ARTICULAR; INTRALESIONAL; INTRAMUSCULAR; INTRAVENOUS; SOFT TISSUE ONCE
Status: DISCONTINUED | OUTPATIENT
Start: 2024-03-11 | End: 2024-03-11

## 2024-03-11 RX ORDER — DEXMEDETOMIDINE HYDROCHLORIDE 100 UG/ML
INJECTION, SOLUTION INTRAVENOUS AS NEEDED
Status: DISCONTINUED | OUTPATIENT
Start: 2024-03-11 | End: 2024-03-11 | Stop reason: SURG

## 2024-03-11 RX ORDER — HYDROCODONE BITARTRATE AND ACETAMINOPHEN 7.5; 325 MG/1; MG/1
1 TABLET ORAL EVERY 6 HOURS PRN
Qty: 28 TABLET | Refills: 0 | Status: SHIPPED | OUTPATIENT
Start: 2024-03-11

## 2024-03-11 RX ORDER — CEPHALEXIN 500 MG/1
500 CAPSULE ORAL EVERY 12 HOURS
Qty: 28 CAPSULE | Refills: 0 | Status: SHIPPED | OUTPATIENT
Start: 2024-03-11 | End: 2024-03-25

## 2024-03-11 RX ORDER — TRANEXAMIC ACID 10 MG/ML
1000 INJECTION, SOLUTION INTRAVENOUS ONCE
Status: DISCONTINUED | OUTPATIENT
Start: 2024-03-11 | End: 2024-03-11 | Stop reason: HOSPADM

## 2024-03-11 RX ORDER — IPRATROPIUM BROMIDE AND ALBUTEROL SULFATE 2.5; .5 MG/3ML; MG/3ML
3 SOLUTION RESPIRATORY (INHALATION) ONCE AS NEEDED
Status: COMPLETED | OUTPATIENT
Start: 2024-03-11 | End: 2024-03-11

## 2024-03-11 RX ORDER — FENTANYL CITRATE 50 UG/ML
50 INJECTION, SOLUTION INTRAMUSCULAR; INTRAVENOUS
Status: DISCONTINUED | OUTPATIENT
Start: 2024-03-11 | End: 2024-03-11 | Stop reason: HOSPADM

## 2024-03-11 RX ORDER — KETAMINE HCL IN NACL, ISO-OSM 100MG/10ML
SYRINGE (ML) INJECTION AS NEEDED
Status: DISCONTINUED | OUTPATIENT
Start: 2024-03-11 | End: 2024-03-11 | Stop reason: SURG

## 2024-03-11 RX ORDER — LIDOCAINE HYDROCHLORIDE 20 MG/ML
INJECTION, SOLUTION EPIDURAL; INFILTRATION; INTRACAUDAL; PERINEURAL AS NEEDED
Status: DISCONTINUED | OUTPATIENT
Start: 2024-03-11 | End: 2024-03-11 | Stop reason: SURG

## 2024-03-11 RX ORDER — LABETALOL HYDROCHLORIDE 5 MG/ML
INJECTION, SOLUTION INTRAVENOUS AS NEEDED
Status: DISCONTINUED | OUTPATIENT
Start: 2024-03-11 | End: 2024-03-11 | Stop reason: SURG

## 2024-03-11 RX ORDER — LABETALOL HYDROCHLORIDE 5 MG/ML
5 INJECTION, SOLUTION INTRAVENOUS
Status: DISCONTINUED | OUTPATIENT
Start: 2024-03-11 | End: 2024-03-11 | Stop reason: HOSPADM

## 2024-03-11 RX ORDER — GLYCOPYRROLATE 0.2 MG/ML
INJECTION INTRAMUSCULAR; INTRAVENOUS AS NEEDED
Status: DISCONTINUED | OUTPATIENT
Start: 2024-03-11 | End: 2024-03-11 | Stop reason: SURG

## 2024-03-11 RX ORDER — MAGNESIUM SULFATE HEPTAHYDRATE 500 MG/ML
INJECTION, SOLUTION INTRAMUSCULAR; INTRAVENOUS AS NEEDED
Status: DISCONTINUED | OUTPATIENT
Start: 2024-03-11 | End: 2024-03-11 | Stop reason: SURG

## 2024-03-11 RX ORDER — HYDRALAZINE HYDROCHLORIDE 20 MG/ML
5 INJECTION INTRAMUSCULAR; INTRAVENOUS
Status: DISCONTINUED | OUTPATIENT
Start: 2024-03-11 | End: 2024-03-11 | Stop reason: HOSPADM

## 2024-03-11 RX ORDER — CELECOXIB 100 MG/1
100 CAPSULE ORAL 2 TIMES DAILY
Qty: 60 CAPSULE | Refills: 0 | Status: SHIPPED | OUTPATIENT
Start: 2024-03-11 | End: 2024-03-11

## 2024-03-11 RX ORDER — HYDRALAZINE HYDROCHLORIDE 20 MG/ML
INJECTION INTRAMUSCULAR; INTRAVENOUS AS NEEDED
Status: DISCONTINUED | OUTPATIENT
Start: 2024-03-11 | End: 2024-03-11 | Stop reason: SURG

## 2024-03-11 RX ORDER — KETAMINE HCL IN NACL, ISO-OSM 100MG/10ML
SYRINGE (ML) INJECTION AS NEEDED
Status: DISCONTINUED | OUTPATIENT
Start: 2024-03-11 | End: 2024-03-11

## 2024-03-11 RX ORDER — TRANEXAMIC ACID 10 MG/ML
1000 INJECTION, SOLUTION INTRAVENOUS ONCE
Status: COMPLETED | OUTPATIENT
Start: 2024-03-11 | End: 2024-03-11

## 2024-03-11 RX ORDER — ONDANSETRON 4 MG/1
4 TABLET, ORALLY DISINTEGRATING ORAL EVERY 6 HOURS PRN
Status: CANCELLED | OUTPATIENT
Start: 2024-03-11

## 2024-03-11 RX ORDER — PROPOFOL 10 MG/ML
INJECTION, EMULSION INTRAVENOUS CONTINUOUS PRN
Status: DISCONTINUED | OUTPATIENT
Start: 2024-03-11 | End: 2024-03-11 | Stop reason: SURG

## 2024-03-11 RX ORDER — ALBUTEROL SULFATE 90 UG/1
AEROSOL, METERED RESPIRATORY (INHALATION) AS NEEDED
Status: DISCONTINUED | OUTPATIENT
Start: 2024-03-11 | End: 2024-03-11 | Stop reason: SURG

## 2024-03-11 RX ORDER — NALOXONE HCL 0.4 MG/ML
0.4 VIAL (ML) INJECTION AS NEEDED
Status: DISCONTINUED | OUTPATIENT
Start: 2024-03-11 | End: 2024-03-11 | Stop reason: HOSPADM

## 2024-03-11 RX ORDER — ONDANSETRON 2 MG/ML
4 INJECTION INTRAMUSCULAR; INTRAVENOUS EVERY 6 HOURS PRN
Status: CANCELLED | OUTPATIENT
Start: 2024-03-11

## 2024-03-11 RX ORDER — EPHEDRINE SULFATE 5 MG/ML
5 INJECTION INTRAVENOUS ONCE AS NEEDED
Status: DISCONTINUED | OUTPATIENT
Start: 2024-03-11 | End: 2024-03-11 | Stop reason: HOSPADM

## 2024-03-11 RX ORDER — FENTANYL CITRATE 50 UG/ML
INJECTION, SOLUTION INTRAMUSCULAR; INTRAVENOUS AS NEEDED
Status: DISCONTINUED | OUTPATIENT
Start: 2024-03-11 | End: 2024-03-11 | Stop reason: SURG

## 2024-03-11 RX ORDER — SODIUM CHLORIDE, SODIUM LACTATE, POTASSIUM CHLORIDE, CALCIUM CHLORIDE 600; 310; 30; 20 MG/100ML; MG/100ML; MG/100ML; MG/100ML
1000 INJECTION, SOLUTION INTRAVENOUS CONTINUOUS
Status: DISCONTINUED | OUTPATIENT
Start: 2024-03-11 | End: 2024-03-11 | Stop reason: HOSPADM

## 2024-03-11 RX ORDER — ONDANSETRON 2 MG/ML
4 INJECTION INTRAMUSCULAR; INTRAVENOUS ONCE AS NEEDED
Status: DISCONTINUED | OUTPATIENT
Start: 2024-03-11 | End: 2024-03-11 | Stop reason: HOSPADM

## 2024-03-11 RX ORDER — TRAMADOL HYDROCHLORIDE 50 MG/1
50 TABLET ORAL EVERY 6 HOURS PRN
Qty: 28 TABLET | Refills: 0 | Status: SHIPPED | OUTPATIENT
Start: 2024-03-11

## 2024-03-11 RX ORDER — EPHEDRINE SULFATE 5 MG/ML
INJECTION INTRAVENOUS AS NEEDED
Status: DISCONTINUED | OUTPATIENT
Start: 2024-03-11 | End: 2024-03-11 | Stop reason: SURG

## 2024-03-11 RX ORDER — ONDANSETRON 2 MG/ML
INJECTION INTRAMUSCULAR; INTRAVENOUS AS NEEDED
Status: DISCONTINUED | OUTPATIENT
Start: 2024-03-11 | End: 2024-03-11 | Stop reason: SURG

## 2024-03-11 RX ORDER — ACETAMINOPHEN 325 MG/1
650 TABLET ORAL ONCE AS NEEDED
Status: DISCONTINUED | OUTPATIENT
Start: 2024-03-11 | End: 2024-03-11 | Stop reason: HOSPADM

## 2024-03-11 RX ORDER — CELECOXIB 100 MG/1
100 CAPSULE ORAL 2 TIMES DAILY
Qty: 60 CAPSULE | Refills: 0 | Status: SHIPPED | OUTPATIENT
Start: 2024-03-11

## 2024-03-11 RX ORDER — MIDAZOLAM HYDROCHLORIDE 1 MG/ML
INJECTION INTRAMUSCULAR; INTRAVENOUS AS NEEDED
Status: DISCONTINUED | OUTPATIENT
Start: 2024-03-11 | End: 2024-03-11 | Stop reason: SURG

## 2024-03-11 RX ORDER — MELOXICAM 15 MG/1
15 TABLET ORAL ONCE
Status: COMPLETED | OUTPATIENT
Start: 2024-03-11 | End: 2024-03-11

## 2024-03-11 RX ORDER — ASPIRIN 81 MG/1
81 TABLET ORAL 2 TIMES DAILY
Qty: 60 TABLET | Refills: 0 | Status: SHIPPED | OUTPATIENT
Start: 2024-03-11 | End: 2024-04-10

## 2024-03-11 RX ORDER — FLUMAZENIL 0.1 MG/ML
0.2 INJECTION INTRAVENOUS AS NEEDED
Status: DISCONTINUED | OUTPATIENT
Start: 2024-03-11 | End: 2024-03-11 | Stop reason: HOSPADM

## 2024-03-11 RX ADMIN — LIDOCAINE HYDROCHLORIDE 100 MG: 20 INJECTION, SOLUTION EPIDURAL; INFILTRATION; INTRACAUDAL; PERINEURAL at 10:26

## 2024-03-11 RX ADMIN — HYDROMORPHONE HYDROCHLORIDE 0.5 MG: 1 INJECTION, SOLUTION INTRAMUSCULAR; INTRAVENOUS; SUBCUTANEOUS at 13:35

## 2024-03-11 RX ADMIN — CEFAZOLIN 2000 MG: 2 INJECTION, POWDER, FOR SOLUTION INTRAMUSCULAR; INTRAVENOUS at 10:30

## 2024-03-11 RX ADMIN — DEXMEDETOMIDINE HYDROCHLORIDE 10 MCG: 100 INJECTION, SOLUTION INTRAVENOUS at 10:15

## 2024-03-11 RX ADMIN — SODIUM CHLORIDE, POTASSIUM CHLORIDE, SODIUM LACTATE AND CALCIUM CHLORIDE 1000 ML: 600; 310; 30; 20 INJECTION, SOLUTION INTRAVENOUS at 09:19

## 2024-03-11 RX ADMIN — EPHEDRINE SULFATE 10 MG: 5 INJECTION INTRAVENOUS at 11:41

## 2024-03-11 RX ADMIN — Medication 5 MG: at 10:54

## 2024-03-11 RX ADMIN — HYDROMORPHONE HYDROCHLORIDE 0.5 MG: 1 INJECTION, SOLUTION INTRAMUSCULAR; INTRAVENOUS; SUBCUTANEOUS at 13:01

## 2024-03-11 RX ADMIN — FENTANYL CITRATE 50 MCG: 50 INJECTION, SOLUTION INTRAMUSCULAR; INTRAVENOUS at 12:44

## 2024-03-11 RX ADMIN — ALBUTEROL SULFATE 8 PUFF: 108 AEROSOL, METERED RESPIRATORY (INHALATION) at 11:22

## 2024-03-11 RX ADMIN — FENTANYL CITRATE 15 MCG: 50 INJECTION, SOLUTION INTRAMUSCULAR; INTRAVENOUS at 09:55

## 2024-03-11 RX ADMIN — TRANEXAMIC ACID 1000 MG: 10 INJECTION, SOLUTION INTRAVENOUS at 10:40

## 2024-03-11 RX ADMIN — OXYCODONE 5 MG: 5 TABLET ORAL at 13:21

## 2024-03-11 RX ADMIN — HYDRALAZINE HYDROCHLORIDE 5 MG: 20 INJECTION INTRAMUSCULAR; INTRAVENOUS at 11:06

## 2024-03-11 RX ADMIN — TRANEXAMIC ACID 1000 MG: 10 INJECTION, SOLUTION INTRAVENOUS at 11:46

## 2024-03-11 RX ADMIN — SODIUM CHLORIDE, POTASSIUM CHLORIDE, SODIUM LACTATE AND CALCIUM CHLORIDE: 600; 310; 30; 20 INJECTION, SOLUTION INTRAVENOUS at 12:05

## 2024-03-11 RX ADMIN — PROPOFOL INJECTABLE EMULSION 150 MG: 10 INJECTION, EMULSION INTRAVENOUS at 11:07

## 2024-03-11 RX ADMIN — ONDANSETRON 4 MG: 2 INJECTION INTRAMUSCULAR; INTRAVENOUS at 10:55

## 2024-03-11 RX ADMIN — FENTANYL CITRATE 85 MCG: 50 INJECTION, SOLUTION INTRAMUSCULAR; INTRAVENOUS at 10:17

## 2024-03-11 RX ADMIN — BUPIVACAINE HYDROCHLORIDE IN DEXTROSE 1.6 MG: 7.5 INJECTION, SOLUTION SUBARACHNOID at 09:55

## 2024-03-11 RX ADMIN — MIDAZOLAM 2 MG: 1 INJECTION INTRAMUSCULAR; INTRAVENOUS at 10:18

## 2024-03-11 RX ADMIN — Medication 5 MG: at 10:58

## 2024-03-11 RX ADMIN — DEXAMETHASONE SODIUM PHOSPHATE 20 MG: 4 INJECTION, SOLUTION INTRAMUSCULAR; INTRAVENOUS at 09:33

## 2024-03-11 RX ADMIN — MELOXICAM 15 MG: 15 TABLET ORAL at 09:20

## 2024-03-11 RX ADMIN — Medication 25 MG: at 10:50

## 2024-03-11 RX ADMIN — IPRATROPIUM BROMIDE AND ALBUTEROL SULFATE 3 ML: .5; 3 SOLUTION RESPIRATORY (INHALATION) at 13:00

## 2024-03-11 RX ADMIN — ACETAMINOPHEN 1000 MG: 500 TABLET, FILM COATED ORAL at 09:20

## 2024-03-11 RX ADMIN — MAGNESIUM SULFATE HEPTAHYDRATE 2 G: 500 INJECTION, SOLUTION INTRAMUSCULAR; INTRAVENOUS at 10:35

## 2024-03-11 RX ADMIN — GLYCOPYRROLATE 0.2 MG: 0.2 INJECTION INTRAMUSCULAR; INTRAVENOUS at 11:00

## 2024-03-11 RX ADMIN — ALBUTEROL SULFATE 8 PUFF: 108 AEROSOL, METERED RESPIRATORY (INHALATION) at 11:39

## 2024-03-11 RX ADMIN — PROPOFOL INJECTABLE EMULSION 75 MCG/KG/MIN: 10 INJECTION, EMULSION INTRAVENOUS at 10:26

## 2024-03-11 NOTE — ANESTHESIA PROCEDURE NOTES
Intrathecal Block    Pre-sedation assessment completed: 3/11/2024 9:48 AM    Patient reassessed immediately prior to procedure    Patient location during procedure: pre-op  Start Time: 3/11/2024 9:48 AM  Stop Time: 3/11/2024 9:58 AM  Performed By  Anesthesiologist: Neil Fernandez MD  Preanesthetic Checklist  Completed: patient identified, site marked, surgical consent, pre-op evaluation, timeout performed, IV checked, risks and benefits discussed and monitors and equipment checked  Intrathecal Block Prep:  Pt Position:sitting  Sterile Tech:sterile barrier, gloves and cap  Prep:chloraprep  Monitoring:blood pressure monitoring, continuous pulse oximetry and EKG  Intrathecal Block Procedure:  Approach:midline  Location:L4-L5  Needle Type:Quincke  Needle Gauge:22 G  Placement of Needle Event: cerebrospinal fluid  Post Assessment:  Patient Tolerance:patient tolerated the procedure well with no apparent complications  Complications:no

## 2024-03-11 NOTE — H&P
"   History & Physical       Patient: Bill Rogers    Proposed Surgery and date: Procedure(s) (LRB):  TOTAL HIP ARTHROPLASTY ANTERIOR (Right)     YOB: 1975    Medical Record Number: [id number]  Wt Readings from Last 3 Encounters:   24 107 kg (236 lb)     Ht Readings from Last 3 Encounters:   24 162.6 cm (64\")     There is no height or weight on file to calculate BMI.  No height and weight on file for this encounter.      Surgeon:  Dr. Laurent Diana M.D.        Chief Complaints: Pain    History of Present Illness: 48 y.o. female presents with right hip osteoarthritis. Onset of symptoms was years ago and has been progressively worsening despite more conservative treatment measures.  Symptoms are associated with ability to move, exercise, and perform activities of daily living.  Symptoms are aggravated by weight bearing and ROM necessary for activities of daily living.   Symptoms improve with rest, ice and elevation only minimally.      Allergies: No Known Allergies    Medications:   Home Medications:  No current facility-administered medications on file prior to encounter.     No current outpatient medications on file prior to encounter.     Current Medications:  Scheduled Meds:  Continuous Infusions:No current facility-administered medications for this encounter.    PRN Meds:.    Past Medical History:   Diagnosis Date    Anxiety     Depression     H/O:      HTN (hypertension)     Hx of laparoscopic gastric banding     Hyperlipidemia     Knee pain, bilateral     Migraines     PONV (postoperative nausea and vomiting)     Sleep apnea         Past Surgical History:   Procedure Laterality Date    BACK SURGERY      BREAST BIOPSY       SECTION      x 3    CHOLECYSTECTOMY      GASTRIC BANDING          Social History     Occupational History    Not on file   Tobacco Use    Smoking status: Never    Smokeless tobacco: Never   Substance and Sexual Activity    Alcohol use: Yes "     Comment: social    Drug use: Never    Sexual activity: Defer      Social History     Social History Narrative    Not on file      No family history on file.      Review of Systems: 14 point review of systems was performed and was negative except as documented in the history of present illness.      Physical Exam: 48 y.o. female    Vital signs reviewed.    General Appearance:    Alert, cooperative, in no acute distress                  General: alert, oriented  Eyes: conjunctiva clear  ENT: external ears and nose atraumatic  CV: no peripheral edema  Resp: normal respiratory effort  Skin: no rashes or wounds; normal turgor  Psych: mood and affect appropriate  Lymph: no nodes appreciated  Neuro: gross sensation intact  Vascular:  Palpable peripheral pulse in noted extremity  Musculoskeletal Extremities:  tenderness over operative extremity. Moves all extremities well, no to minimal LE edema, no cyanosis, no redness            Diagnostic Tests:  Lab Results (last 7 days)       Procedure Component Value Units Date/Time    Basic Metabolic Panel [[id number]]  (Normal) Collected: 03/05/24 1142    Specimen: Blood Updated: 03/05/24 1616     Glucose 89 mg/dL      BUN 10 mg/dL      Creatinine 0.98 mg/dL      Sodium 141 mmol/L      Potassium 4.0 mmol/L      Chloride 105 mmol/L      CO2 24.1 mmol/L      Calcium 9.1 mg/dL      BUN/Creatinine Ratio 10.2     Anion Gap 11.9 mmol/L      eGFR 70.9 mL/min/1.73     Narrative:      GFR Normal >60  Chronic Kidney Disease <60  Kidney Failure <15      Albumin [[id number]]  (Normal) Collected: 03/05/24 1142    Specimen: Blood Updated: 03/05/24 1616     Albumin 4.0 g/dL     Hemoglobin A1c [[id number]]  (Normal) Collected: 03/05/24 1142    Specimen: Blood Updated: 03/05/24 1613     Hemoglobin A1C 5.60 %     Narrative:      Hemoglobin A1C Ranges:    Increased Risk for Diabetes  5.7% to 6.4%  Diabetes                     >= 6.5%  Diabetic Goal                < 7.0%    CBC & Differential  [[id number]]  (Normal) Collected: 03/05/24 1142    Specimen: Blood Updated: 03/05/24 1612    Narrative:      The following orders were created for panel order CBC & Differential.  Procedure                               Abnormality         Status                     ---------                               -----------         ------                     CBC Auto Differential[[id number]]        Normal              Final result                 Please view results for these tests on the individual orders.    CBC Auto Differential [[id number]]  (Normal) Collected: 03/05/24 1142    Specimen: Blood Updated: 03/05/24 1612     WBC 5.47 10*3/mm3      RBC 4.48 10*6/mm3      Hemoglobin 12.2 g/dL      Hematocrit 38.3 %      MCV 85.5 fL      MCH 27.2 pg      MCHC 31.9 g/dL      RDW 13.0 %      RDW-SD 40.7 fl      MPV 9.7 fL      Platelets 234 10*3/mm3      Neutrophil % 43.1 %      Lymphocyte % 45.3 %      Monocyte % 7.3 %      Eosinophil % 3.1 %      Basophil % 0.7 %      Immature Grans % 0.5 %      Neutrophils, Absolute 2.35 10*3/mm3      Lymphocytes, Absolute 2.48 10*3/mm3      Monocytes, Absolute 0.40 10*3/mm3      Eosinophils, Absolute 0.17 10*3/mm3      Basophils, Absolute 0.04 10*3/mm3      Immature Grans, Absolute 0.03 10*3/mm3      nRBC 0.0 /100 WBC             Radiology images/reports reviewed      Assessment: Right hip osteoarthritis    Plan:  We will plan on proceeding with surgery at patient's request. Dr. Diana has reviewed details of procedure with patient today and discussed risks, benefits, alternatives, and limitations of the procedure in laymen's terms with the risks including but not limited to: Allergy to medication, allergy to implant, device recall, leg length discrepancy, dislocation,  need to stop the surgery early or change to a different procedure, stiffness requiring revision surgeries neurovascular damage, foot drop, bleeding, infection, chronic pain, worsening of pain, chondrolysis, recurrent problem,   swelling, loss of motion, weakness, stiffness, instability, DVT, pulmonary embolus, death, stroke, complex regional pain syndrome, and need for additional procedures.  No guarantees were given regarding results of surgery.     Patient was given the opportunity to ask and have all questions answered today.  The patient voiced understanding of the risks, benefits, and alternative forms of treatment that were discussed and the patient consents to proceed with surgery.     Discharge Plan: Home with f/u in with Dr. Diana  Date: 3/11/2024  Laurent Diana MD

## 2024-03-11 NOTE — ANESTHESIA PREPROCEDURE EVALUATION
Anesthesia Evaluation     Patient summary reviewed and Nursing notes reviewed   history of anesthetic complications:  PONV  NPO Solid Status: > 8 hours             Airway   Mallampati: II  TM distance: >3 FB  Neck ROM: full  No difficulty expected  Dental - normal exam     Pulmonary - normal exam   (+) ,sleep apnea on CPAP  (-) not a smoker  Cardiovascular - normal exam    ECG reviewed    (+) hypertension, hyperlipidemia  (-) angina, FERNÁNDEZ      Neuro/Psych- negative ROS  GI/Hepatic/Renal/Endo - negative ROS     Musculoskeletal (-) negative ROS        ROS comment: Several back surgeries  Abdominal  - normal exam    Bowel sounds: normal.   Substance History - negative use     OB/GYN negative ob/gyn ROS         Other                    Anesthesia Plan    ASA 3     spinal       Anesthetic plan, risks, benefits, and alternatives have been provided, discussed and informed consent has been obtained with: patient.    CODE STATUS:

## 2024-03-11 NOTE — THERAPY EVALUATION
[facility]  [facility] 1975    MRN: [id number]                              Today's Date: 3/11/2024       Admit Date: 3/11/2024    Visit Dx:     ICD-10-CM ICD-9-CM   1. Status post total hip replacement, right  Z96.641 V43.64     There is no problem list on file for this patient.    Past Medical History:   Diagnosis Date    Anxiety     Depression     H/O:      HTN (hypertension)     Hx of laparoscopic gastric banding     Hyperlipidemia     Knee pain, bilateral     Migraines     PONV (postoperative nausea and vomiting)     Sleep apnea      Past Surgical History:   Procedure Laterality Date    BACK SURGERY      lumbar    BREAST BIOPSY       SECTION      x 3    CHOLECYSTECTOMY      GASTRIC BANDING      TONSILLECTOMY        General Information       Row Name 24 1607          Physical Therapy Time and Intention    Document Type evaluation  -OD     Mode of Treatment physical therapy  -OD       Row Name 24 1607          General Information    Patient Profile Reviewed yes  -OD     Prior Level of Function min assist:  Pt uses rollator for mobility; min-modA for ADLs d/t pain  -OD     Existing Precautions/Restrictions fall;hip, anterior  -OD     Barriers to Rehab medically complex;previous functional deficit;ineffective coping  -OD       Row Name 24 1607          Living Environment    People in Home spouse;child(meredith), dependent  -OD       Row Name 24 1607          Home Main Entrance    Number of Stairs, Main Entrance three  -OD       Row Name 24 1607          Stairs Within Home, Primary    Number of Stairs, Within Home, Primary none  -OD       Row Name 24 1607          Cognition    Orientation Status (Cognition) oriented x 4  -OD       Row Name 24 1607          Safety Issues, Functional Mobility    Impairments Affecting Function (Mobility) balance;endurance/activity tolerance;pain;motor control;strength;sensation/sensory awareness  -OD                User Key  (r) = Recorded By, (t) = Taken By, (c) = Cosigned By      Initials Name Provider Type    OD Rin Grewal, PT Physical Therapist                   Mobility       Row Name 03/11/24 1607          Bed Mobility    Bed Mobility bed mobility (all) activities  -OD     All Activities, Lipscomb (Bed Mobility) moderate assist (50% patient effort)  -OD       Row Name 03/11/24 1607          Sit-Stand Transfer    Sit-Stand Lipscomb (Transfers) contact guard  -OD     Assistive Device (Sit-Stand Transfers) walker, front-wheeled  -OD       Row Name 03/11/24 1607          Gait/Stairs (Locomotion)    Lipscomb Level (Gait) contact guard  -OD     Assistive Device (Gait) walker, front-wheeled  -OD     Patient was able to Ambulate yes  -OD     Distance in Feet (Gait) 15 feet  -OD     Deviations/Abnormal Patterns (Gait) weight shifting decreased;stride length decreased;base of support, narrow  -OD               User Key  (r) = Recorded By, (t) = Taken By, (c) = Cosigned By      Initials Name Provider Type    OD Rin Grewal, ANASTACIA Physical Therapist                   Obj/Interventions       Row Name 03/11/24 1608          Range of Motion Comprehensive    General Range of Motion bilateral lower extremity ROM WFL  -OD       Row Name 03/11/24 1608          Strength Comprehensive (MMT)    General Manual Muscle Testing (MMT) Assessment lower extremity strength deficits identified  -OD     Comment, General Manual Muscle Testing (MMT) Assessment Pt demo weakness BLE; no formal MMT performed d/t pain  -OD       Row Name 03/11/24 1608          Motor Skills    Therapeutic Exercise hip  -OD       Row Name 03/11/24 1608          Hip (Therapeutic Exercise)    Hip (Therapeutic Exercise) other (see comments)  instructed and edu regarding post-op HEP  -OD       Row Name 03/11/24 1608          Balance    Balance Interventions sitting;standing;sit to stand;supported;minimal challenge;moderate challenge  -OD       Row Name  03/11/24 1608          Sensory Assessment (Somatosensory)    Sensory Assessment (Somatosensory) other (see comments)  ble sensation impaired after sx  -OD               User Key  (r) = Recorded By, (t) = Taken By, (c) = Cosigned By      Initials Name Provider Type    OD Rin Grewal, PT Physical Therapist                   Goals/Plan    No documentation.                  Clinical Impression       Row Name 03/11/24 1609          Pain    Pretreatment Pain Rating 7/10  -OD     Posttreatment Pain Rating 8/10  -OD     Pain Location - Side/Orientation Right  -OD     Pain Location incisional  -OD     Pain Location - hip  -OD       Row Name 03/11/24 1609          Plan of Care Review    Plan of Care Reviewed With patient;spouse  -OD     Progress no change  -OD     Outcome Evaluation Bill Hutchison is a 47 y/o F with PMH of lumbar fusion and chronic hip pain was admitted to [facility] for R anterior ANALILIA with Dr. Diana. At baseline, pt lives in [facility] with 3STE with her spouse and 2 children. Pt reports using RW for household/community and requires assist for dressing/bathing from spouse. Pt requires Cayla for stair climbing. Edu pt regarding post-op ther ex and recommendations. Pt this date reports 7-8/10 pain. Pt required modA for sidelying>sit bed mobility, CGA for STS and ambulation. Pt hypotensive but stable and able to progress to ambulation x 15 feet with RW and chair follow. Ended session with in bathroom with RN supervision. Pt would benefit from HHPT and transition to OPPT upon d/c. PT will sign off at this time, re-consult if pt admitted.  -OD       Row Name 03/11/24 1609          Therapy Assessment/Plan (PT)    Criteria for Skilled Interventions Met (PT) no;no problems identified which require skilled intervention  -OD     Therapy Frequency (PT) evaluation only  -OD       Row Name 03/11/24 1609          Vital Signs    O2 Delivery Pre Treatment room air  -OD     O2 Delivery Intra Treatment room air  -OD     O2  Delivery Post Treatment room air  -OD       Row Name 03/11/24 1609          Positioning and Restraints    Pre-Treatment Position in bed  -OD     Post Treatment Position bathroom  -OD     Bathroom with nsg;call light within reach;encouraged to call for assist  -OD               User Key  (r) = Recorded By, (t) = Taken By, (c) = Cosigned By      Initials Name Provider Type    Rin Tate, ANASTACIA Physical Therapist                   Outcome Measures       Row Name 03/11/24 1613          How much help from another person do you currently need...    Turning from your back to your side while in flat bed without using bedrails? 3  -OD     Moving from lying on back to sitting on the side of a flat bed without bedrails? 3  -OD     Moving to and from a bed to a chair (including a wheelchair)? 3  -OD     Standing up from a chair using your arms (e.g., wheelchair, bedside chair)? 3  -OD     Climbing 3-5 steps with a railing? 2  -OD     To walk in hospital room? 3  -OD     AM-PAC 6 Clicks Score (PT) 17  -OD     Highest Level of Mobility Goal 5 --> Static standing  -OD       Row Name 03/11/24 1613          Functional Assessment    Outcome Measure Options AM-PAC 6 Clicks Basic Mobility (PT)  -OD               User Key  (r) = Recorded By, (t) = Taken By, (c) = Cosigned By      Initials Name Provider Type    Rin Tate, ANASTACIA Physical Therapist                                 Physical Therapy Education       Title: PT OT SLP Therapies (Done)       Topic: Physical Therapy (Done)       Point: Mobility training (Done)       Learning Progress Summary             Patient Acceptance, E, VU by OD at 3/11/2024 1613                         Point: Home exercise program (Done)       Learning Progress Summary             Patient Acceptance, E, VU by OD at 3/11/2024 1613                         Point: Body mechanics (Done)       Learning Progress Summary             Patient Acceptance, E, VU by OD at 3/11/2024 1613                          Point: Precautions (Done)       Learning Progress Summary             Patient Acceptance, E, VU by OD at 3/11/2024 1613                                         User Key       Initials Effective Dates Name Provider Type Discipline    OD 05/11/23 -  Rin Grewal, ANASTACIA Physical Therapist PT                  PT Recommendation and Plan     Plan of Care Reviewed With: patient, spouse  Progress: no change  Outcome Evaluation: Bill Hutchison is a 49 y/o F with PMH of lumbar fusion and chronic hip pain was admitted to [facility] for R anterior ANALILIA with Dr. Diana. At baseline, pt lives in [facility] with 3STE with her spouse and 2 children. Pt reports using RW for household/community and requires assist for dressing/bathing from spouse. Pt requires Cayla for stair climbing. Edu pt regarding post-op ther ex and recommendations. Pt this date reports 7-8/10 pain. Pt required modA for sidelying>sit bed mobility, CGA for STS and ambulation. Pt hypotensive but stable and able to progress to ambulation x 15 feet with RW and chair follow. Ended session with in bathroom with RN supervision. Pt would benefit from HHPT and transition to OPPT upon d/c. PT will sign off at this time, re-consult if pt admitted.     Time Calculation:         PT Charges       Row Name 03/11/24 1614             Time Calculation    Start Time 1428  -OD      Stop Time 1521  -OD      Time Calculation (min) 53 min  -OD      PT Received On 03/11/24  -OD         Time Calculation- PT    Total Timed Code Minutes- PT 0 minute(s)  -OD                User Key  (r) = Recorded By, (t) = Taken By, (c) = Cosigned By      Initials Name Provider Type    OD Rin Grewal, PT Physical Therapist                  Therapy Charges for Today       Code Description Service Date Service Provider Modifiers Qty    [id number]  PT EVAL MOD COMPLEXITY 4 3/11/2024 Rin Grewal, PT GP 1            PT G-Codes  Outcome Measure Options: AM-PAC 6 Clicks Basic Mobility (PT)  AM-PAC 6 Clicks  Score (PT): 17  PT Discharge Summary  Anticipated Discharge Disposition (PT): home with home health, home with outpatient therapy services    Rin Esqueda, PT  3/11/2024

## 2024-03-11 NOTE — ANESTHESIA POSTPROCEDURE EVALUATION
Patient: Bill StoneQuintin    Procedure Summary       Date: 03/11/24 Room / Location: [facility] OR 11 / [facility] MAIN OR    Anesthesia Start: 1011 Anesthesia Stop: 1222    Procedure: TOTAL HIP ARTHROPLASTY ANTERIOR (Right: Hip) Diagnosis:       Osteoarthritis      (Osteoarthritis [M19.90])    Surgeons: Laurent Diana MD Provider: Neil Fernandez MD    Anesthesia Type: spinal ASA Status: 3            Anesthesia Type: spinal    Vitals  Vitals Value Taken Time   /70 03/11/24 1256   Temp 97.4 °F (36.3 °C) 03/11/24 1224   Pulse 83 03/11/24 1259   Resp 19 03/11/24 1254   SpO2 88 % 03/11/24 1259   Vitals shown include unfiled device data.        Post Anesthesia Care and Evaluation    Patient location during evaluation: PACU  Patient participation: complete - patient participated  Level of consciousness: awake  Pain score: 0  Pain management: adequate  Anesthetic complications: No anesthetic complications  PONV Status: none  Cardiovascular status: acceptable  Respiratory status: acceptable  Hydration status: acceptable

## 2024-03-11 NOTE — PLAN OF CARE
Goal Outcome Evaluation:  Plan of Care Reviewed With: patient, spouse        Progress: no change  Outcome Evaluation: Bill Hutchison is a 47 y/o F with PMH of lumbar fusion and chronic hip pain was admitted to [facility] for R anterior ANALILIA with Dr. Diana. At baseline, pt lives in [facility] with 3STE with her spouse and 2 children. Pt reports using RW for household/community and requires assist for dressing/bathing from spouse. Pt requires Cayla for stair climbing. Edu pt regarding post-op ther ex and recommendations. Pt this date reports 7-8/10 pain. Pt required modA for sidelying>sit bed mobility, CGA for STS and ambulation. Pt hypotensive but stable and able to progress to ambulation x 15 feet with RW and chair follow. Ended session with in bathroom with RN supervision. Pt would benefit from HHPT and transition to OPPT upon d/c. PT will sign off at this time, re-consult if pt admitted.      Anticipated Discharge Disposition (PT): home with home health, home with outpatient therapy services

## 2024-03-11 NOTE — DISCHARGE INSTRUCTIONS
"Weight bearing as tolerated with your walker.  Get up and walk with your walker every 1 to 1.5 hours during the day.  When resting, elevate legs and use ice, as needed.    Continue diet as at home.    Dr. Diana Post-Op Hip Incision Care  OZZY  You have a OZZY wound dressing in place.  This was placed under sterile conditions in the operating room.  It remains in place until your follow-up appointment.  The drain will operate with slight suction for 7 days.  After 7 days, it will stop working automatically.  At that time, remove the batteries from the battery pack and then discard the battery pack in the trash.  Cover the end of the tubing with plastic wrap and tape it to the dressing.  Keep it this way until your follow-up appointment.  Showering is ok after surgery.  Pause the battery pack (push the large orange button) and unscrew the battery pack from the tubing.  Place plastic wrap or tape over the end of the tubing.  Shower, attempting to keep the dressing out of the stream of water.  When finished, gently pat the dressing and tubing dry, remove plastic wrap or tape from end of tubing, and reattach tubing to the battery pack.  Then press the large orange button.  You will feel the battery pack vibrate until it achieves suction.  Then it will flash green over the \"ok\" button and run as usual.      "

## 2024-03-11 NOTE — DISCHARGE SUMMARY
Patient: Bill Rogers      YOB: 1975    Medical Record Number: [id number]    Attending Physician: Laurent Diana MD  Consulting Physician(s):   Date of Admission: 3/11/2024  7:59 AM  Date of Discharge:       There is no problem list on file for this patient.    Status Post: DC ARTHRP ACETBLR/PROX FEM PROSTC AGRFT/ALGRFT [18343] (TOTAL HIP ARTHROPLASTY ANTERIOR)      No Known Allergies    Current Medications:     Discharge Medications        New Medications        Instructions Start Date   aspirin 81 MG EC tablet   81 mg, Oral, 2 Times Daily      celecoxib 100 MG capsule  Commonly known as: CeleBREX   100 mg, Oral, 2 Times Daily      cephalexin 500 MG capsule  Commonly known as: KEFLEX   500 mg, Oral, Every 12 Hours      traMADol 50 MG tablet  Commonly known as: ULTRAM   50 mg, Oral, Every 6 Hours PRN             Continue These Medications        Instructions Start Date   albuterol sulfate  (90 Base) MCG/ACT inhaler  Commonly known as: PROVENTIL HFA;VENTOLIN HFA;PROAIR HFA   2 puffs, Inhalation, Every 4 Hours PRN      amLODIPine 10 MG tablet  Commonly known as: NORVASC   10 mg, Oral, Daily      buPROPion  MG 24 hr tablet  Commonly known as: WELLBUTRIN XL   450 mg, Oral, Daily      clonazePAM 0.5 MG tablet  Commonly known as: KlonoPIN   0.5 mg, Oral, 2 Times Daily PRN      cloNIDine 0.2 MG/24HR patch  Commonly known as: CATAPRES-TTS   1 patch, Transdermal, Weekly      escitalopram 20 MG tablet  Commonly known as: LEXAPRO   20 mg, Oral, Daily      gabapentin 400 MG capsule  Commonly known as: NEURONTIN   400 mg, Oral, 3 Times Daily      HYDROcodone-acetaminophen 7.5-325 MG per tablet  Commonly known as: Norco   1 tablet, Oral, Every 6 Hours PRN      lamoTRIgine 100 MG tablet  Commonly known as: LaMICtal   100 mg, Oral, Daily      lidocaine 5 %  Commonly known as: LIDODERM   1 patch, Transdermal, Every 24 Hours, Remove & Discard patch within 12 hours or as directed by MD       lisinopril 20 MG tablet  Commonly known as: PRINIVIL,ZESTRIL   20 mg, Oral, Daily, Hold 24 hours       meclizine 25 MG tablet  Commonly known as: ANTIVERT   25 mg, Oral, 3 Times Daily PRN      ondansetron 4 MG tablet  Commonly known as: ZOFRAN   4 mg, Oral, Every 8 Hours PRN      pantoprazole 40 MG EC tablet  Commonly known as: PROTONIX   40 mg, Oral, Daily      promethazine 25 MG tablet  Commonly known as: PHENERGAN   25 mg, Oral, Every 6 Hours PRN      rosuvastatin 20 MG tablet  Commonly known as: CRESTOR   20 mg, Oral, Daily      SUMAtriptan 50 MG tablet  Commonly known as: IMITREX   50 mg, Oral, Every 2 Hours PRN, Take one tablet at onset of headache. May repeat dose one time in 2 hours if headache not relieved.      tiZANidine 4 MG tablet  Commonly known as: ZANAFLEX   4 mg, Oral, Every 8 Hours PRN      traZODone 50 MG tablet  Commonly known as: DESYREL   50 mg, Oral, Nightly      Vitamin D (Cholecalciferol) 10 MCG (400 UNIT) tablet  Commonly known as: CHOLECALCIFEROL   400 Units, Oral, Daily      ZINC 15 PO   Oral, Hold for surgery                    Past Medical History:   Diagnosis Date    Anxiety     Depression     H/O:      HTN (hypertension)     Hx of laparoscopic gastric banding     Hyperlipidemia     Knee pain, bilateral     Migraines     PONV (postoperative nausea and vomiting)     Sleep apnea      Past Surgical History:   Procedure Laterality Date    BACK SURGERY      lumbar    BREAST BIOPSY       SECTION      x 3    CHOLECYSTECTOMY      GASTRIC BANDING      TONSILLECTOMY       Social History     Occupational History    Not on file   Tobacco Use    Smoking status: Never    Smokeless tobacco: Never   Substance and Sexual Activity    Alcohol use: Yes     Comment: social    Drug use: Never    Sexual activity: Defer      Social History     Social History Narrative    Not on file     History reviewed. No pertinent family history.      Physical Exam: 48 y.o. female  General Appearance:     Alert, cooperative, in no acute distress                      Vitals:    03/11/24 0956 03/11/24 0958 03/11/24 1000 03/11/24 1005   BP: 118/84 121/82 115/74 112/74   BP Location: Left arm Left arm  Left arm   Patient Position: Sitting Sitting  Lying   Pulse: 73 77 75 70   Resp: 14 18 17   Temp:       TempSrc:       SpO2: 100% 100% 100% 96%   Weight:       Height:            Head:    Normocephalic, without obvious abnormality, atraumatic   Eyes:            Lids and lashes normal, conjunctivae and sclerae normal, no   icterus, no pallor, corneas clear, PERRLA   Ears:    Ears appear intact with no abnormalities noted   Throat:   No oral lesions, no thrush, oral mucosa moist   Neck:   No adenopathy, supple, trachea midline, no thyromegaly, no    carotid bruit, no JVD   Back:     No kyphosis present, no scoliosis present, no skin lesions,       erythema or scars, no tenderness to percussion or                   palpation,   range of motion normal   Lungs:     Clear to auscultation,respirations regular, even and                   unlabored    Heart:    Regular rhythm and normal rate, normal S1 and S2, no            murmur, no gallop, no rub, no click   Chest Wall:    No abnormalities observed   Abdomen:     Normal bowel sounds, no masses, no organomegaly, soft        non-tender, non-distended, no guarding, no rebound                 tenderness   Rectal:     Deferred   Extremities:   Incision intact without signs or symptoms of infection.               Neurovascular status remains intact to operative extremity.      Moves all extremities well, no edema, no cyanosis, no              redness   Pulses:   Pulses palpable and equal bilaterally   Skin:   No bleeding, bruising or rash   Lymph nodes:   No palpable adenopathy   Neurologic:   Cranial nerves 2 - 12 grossly intact, sensation intact, DTR        present and equal bilaterally           Hospital Course:  48 y.o. female admitted to [facility] to services of Laurent  Ted Diana MD with Osteoarthritis [M19.90] on 3/11/2024 and underwent CT ARTHRP ACETBLR/PROX FEM PROSTC AGRFT/ALGRFT [08606] (TOTAL HIP ARTHROPLASTY ANTERIOR)  Per Laurent Diana MD. Antibiotic and VTE prophylaxis were per SCIP protocols. Post-operatively the patient transferred to the post-operative floor where the patient underwent mobilization therapy that included active as well as passive ROM exercises. Opioids were titrated to achieve appropriate pain management to allow for participation in mobilization exercises. Vital signs are now stable. The incision is intact without signs or symptoms of infection. Operative extremity neurovascular status remains intact.   Appropriate education re: incision care, activity levels, medications, and follow up visits was completed and all questions were answered. The patient is now deemed stable for discharge to Home.      DIAGNOSTIC TESTS:     Admission on 03/11/2024   Component Date Value Ref Range Status    ABO Type 03/05/2024 O   Final    RH type 03/05/2024 Positive   Final    Antibody Screen 03/05/2024 Negative   Final    T&S Expiration Date 03/05/2024 3/13/2024 11:59:00 PM   Final    Glucose 03/05/2024 89  65 - 99 mg/dL Final    BUN 03/05/2024 10  6 - 20 mg/dL Final    Creatinine 03/05/2024 0.98  0.57 - 1.00 mg/dL Final    Sodium 03/05/2024 141  136 - 145 mmol/L Final    Potassium 03/05/2024 4.0  3.5 - 5.2 mmol/L Final    Chloride 03/05/2024 105  98 - 107 mmol/L Final    CO2 03/05/2024 24.1  22.0 - 29.0 mmol/L Final    Calcium 03/05/2024 9.1  8.6 - 10.5 mg/dL Final    BUN/Creatinine Ratio 03/05/2024 10.2  7.0 - 25.0 Final    Anion Gap 03/05/2024 11.9  5.0 - 15.0 mmol/L Final    eGFR 03/05/2024 70.9  >60.0 mL/min/1.73 Final    Hemoglobin A1C 03/05/2024 5.60  4.80 - 5.60 % Final    Albumin 03/05/2024 4.0  3.5 - 5.2 g/dL Final    WBC 03/05/2024 5.47  3.40 - 10.80 10*3/mm3 Final    RBC 03/05/2024 4.48  3.77 - 5.28 10*6/mm3 Final    Hemoglobin 03/05/2024 12.2  12.0  - 15.9 g/dL Final    Hematocrit 03/05/2024 38.3  34.0 - 46.6 % Final    MCV 03/05/2024 85.5  79.0 - 97.0 fL Final    MCH 03/05/2024 27.2  26.6 - 33.0 pg Final    MCHC 03/05/2024 31.9  31.5 - 35.7 g/dL Final    RDW 03/05/2024 13.0  12.3 - 15.4 % Final    RDW-SD 03/05/2024 40.7  37.0 - 54.0 fl Final    MPV 03/05/2024 9.7  6.0 - 12.0 fL Final    Platelets 03/05/2024 234  140 - 450 10*3/mm3 Final    Neutrophil % 03/05/2024 43.1  42.7 - 76.0 % Final    Lymphocyte % 03/05/2024 45.3  19.6 - 45.3 % Final    Monocyte % 03/05/2024 7.3  5.0 - 12.0 % Final    Eosinophil % 03/05/2024 3.1  0.3 - 6.2 % Final    Basophil % 03/05/2024 0.7  0.0 - 1.5 % Final    Immature Grans % 03/05/2024 0.5  0.0 - 0.5 % Final    Neutrophils, Absolute 03/05/2024 2.35  1.70 - 7.00 10*3/mm3 Final    Lymphocytes, Absolute 03/05/2024 2.48  0.70 - 3.10 10*3/mm3 Final    Monocytes, Absolute 03/05/2024 0.40  0.10 - 0.90 10*3/mm3 Final    Eosinophils, Absolute 03/05/2024 0.17  0.00 - 0.40 10*3/mm3 Final    Basophils, Absolute 03/05/2024 0.04  0.00 - 0.20 10*3/mm3 Final    Immature Grans, Absolute 03/05/2024 0.03  0.00 - 0.05 10*3/mm3 Final    nRBC 03/05/2024 0.0  0.0 - 0.2 /100 WBC Final    HCG, Urine QL 03/11/2024 Negative  Negative Final       No results found.    Discharge and Follow up Instructions:     Follow up in 2 weeks or sooner if needed    Date: 3/11/2024    Estelle España APRN

## 2024-03-11 NOTE — OP NOTE
"Name: Bill Rogers  YOB: 1975    DATE OF SURGERY: 3/11/2024    PREOPERATIVE DIAGNOSIS: Right hip end-stage osteoarthritis    POSTOPERATIVE DIAGNOSIS: Right hip end-stage osteoarthritis    PROCEDURE PERFORMED: Right anterior total hip replacement    SURGICAL APPROACH: Surgical Approach: Hip Direct Anterior (Smith-Gamez)     SURGEON: Laurent Diana M.D.    ASSISTANT: DONNY CHOI SARAH ALLISON HELLMUELLER, AMANDA S    IMPLANTS: Medacta dual mobility size 48 cup, a size 0 cemented stem, a +4 head ball  Estimated Blood Loss: 150cc  Specimens : none  Complications: none    DESCRIPTION OF PROCEDURE: The patient was taken to the operating room and placed in the supine position. A sequential compression device was carefully placed on the non-operative leg. Preoperative antibiotics were administered. Surgical time out was performed. After adequate induction of anesthesia, the feet were padded and placed in the [place] table boots. The patient ws then transferred onto the [place] table and positioned appropriately. C-arm image intensification was then used to take images of the pelvis and operative hip to be used for later comparison. The hip was then prepped and draped in the usual sterile fashion.   An incision was then made starting 2 cm lateral to the ASIS heading distal and lateral at approximately 30 degrees. The subcutaneous fat was then divided down to the fascia overlying the tensor fascia rizwana (TFL) muscle. This was sharply divided staying a few cm lateral to the interval between the sartorius and the TFL muscle. This interval was then bluntly dissected. The circumflex vessels were then identified, cauterized, and divided. There was excellent hemostasis. Cobra retractors were then placed around the femoral neck capsule. The capsule was then divided using a \"T\" capsulotomy. The retractors were then placed intracapsular the the neck osteotomy was performed.  the head was removed " "using a corkscrew. There were end-stage arthritic findings.   The acetabulum was then exposed with \"number 7\" retractors. The labrum and pulvinar were excised. The starting reamer was then used to medialize the cup and then the acetabular reaming proceeded in 2mm increments. The cup was then partially seated and c-arm was used to confirm the final cup position before final seating occurred. There was excellent position and stability of the cup.     Attention was turned to the femur.Traction was removed form the hip and the leg was brought to the neutral position. The femoral elevator hook was placed. The leg was then moved to the external rotation, extension, and adduction position. Retractors were placed around the proximal femur and then the posterior capsule and conjoined tendon was the  Released. The box osteotome was then used to creat the starting hole. The femur was then prepared using the rat tail broach, followed by the chili-pepper broach and then we progressively broached up the final broach which fit nicely with excellent rotational and axial stability. The hip was then reduced and c-arm images were taken which confirmed appropriate fit and position on the implants. There were no complicating factors noted, and flouro images were taken which confirmed proper restoration of leg length and offset.  We checked anatomical landmarks and range of motion to ensure stability and adequate length and offset.  There is still a slight bit of toggle and we elected to use a cemented femoral component.  The trial components were removed, the hip was copiously irrigated and the final implants were then seated, . C-arm images again confirmed appropriate anatomy restoration without complicating factors noted. The final head was then placed on a clean dry taper and the hip reduced.   The hip was then copiously irrigated.  There was excellent hemostasis.We closed the hip in multiple layers in standard fashion. Sterile " dressing were applied. At the end of the case, the sponge and needle counts were reported as being correct. There were no known complications. The patient was then transported to the recovery room.    Assistant: Estelle España APRN; Sarita Crane APRN; Jay Coles APRN  was responsible for performing the following activities: Retraction, Suction, Irrigation, Suturing, Closing, and Placing Dressing and their skilled assistance was necessary for the success of this case.      Laurent Diana M.D.  3/11/2024

## 2024-10-04 ENCOUNTER — HOSPITAL ENCOUNTER (OUTPATIENT)
Dept: CARDIOLOGY | Facility: HOSPITAL | Age: 49
Discharge: HOME | End: 2024-10-04
Payer: MEDICARE

## 2024-10-04 ENCOUNTER — LAB (OUTPATIENT)
Dept: LAB | Facility: HOSPITAL | Age: 49
End: 2024-10-04
Payer: MEDICARE

## 2024-10-04 LAB
ABO GROUP BLD: NORMAL
ALBUMIN SERPL-MCNC: 3.8 G/DL (ref 3.5–5.2)
ANION GAP SERPL CALCULATED.3IONS-SCNC: 8 MMOL/L (ref 5–15)
BASOPHILS # BLD AUTO: 0.06 10*3/MM3 (ref 0–0.2)
BASOPHILS NFR BLD AUTO: 0.8 % (ref 0–1.5)
BLD GP AB SCN SERPL QL: NEGATIVE
BUN SERPL-MCNC: 8 MG/DL (ref 6–20)
BUN/CREAT SERPL: 8.8 (ref 7–25)
CALCIUM SPEC-SCNC: 9 MG/DL (ref 8.6–10.5)
CHLORIDE SERPL-SCNC: 104 MMOL/L (ref 98–107)
CO2 SERPL-SCNC: 27 MMOL/L (ref 22–29)
CREAT SERPL-MCNC: 0.91 MG/DL (ref 0.57–1)
DEPRECATED RDW RBC AUTO: 39.8 FL (ref 37–54)
EGFRCR SERPLBLD CKD-EPI 2021: 77 ML/MIN/1.73
EOSINOPHIL # BLD AUTO: 0.16 10*3/MM3 (ref 0–0.4)
EOSINOPHIL NFR BLD AUTO: 2 % (ref 0.3–6.2)
ERYTHROCYTE [DISTWIDTH] IN BLOOD BY AUTOMATED COUNT: 13 % (ref 12.3–15.4)
GLUCOSE SERPL-MCNC: 95 MG/DL (ref 65–99)
HBA1C MFR BLD: 6 % (ref 4.8–5.6)
HCT VFR BLD AUTO: 40.6 % (ref 34–46.6)
HGB BLD-MCNC: 12.8 G/DL (ref 12–15.9)
IMM GRANULOCYTES # BLD AUTO: 0.03 10*3/MM3 (ref 0–0.05)
IMM GRANULOCYTES NFR BLD AUTO: 0.4 % (ref 0–0.5)
LYMPHOCYTES # BLD AUTO: 3.21 10*3/MM3 (ref 0.7–3.1)
LYMPHOCYTES NFR BLD AUTO: 40.9 % (ref 19.6–45.3)
MCH RBC QN AUTO: 26.6 PG (ref 26.6–33)
MCHC RBC AUTO-ENTMCNC: 31.5 G/DL (ref 31.5–35.7)
MCV RBC AUTO: 84.4 FL (ref 79–97)
MONOCYTES # BLD AUTO: 0.58 10*3/MM3 (ref 0.1–0.9)
MONOCYTES NFR BLD AUTO: 7.4 % (ref 5–12)
MRSA DNA SPEC QL NAA+PROBE: NORMAL
NEUTROPHILS NFR BLD AUTO: 3.8 10*3/MM3 (ref 1.7–7)
NEUTROPHILS NFR BLD AUTO: 48.5 % (ref 42.7–76)
NRBC BLD AUTO-RTO: 0 /100 WBC (ref 0–0.2)
PLATELET # BLD AUTO: 254 10*3/MM3 (ref 140–450)
PMV BLD AUTO: 9.8 FL (ref 6–12)
POTASSIUM SERPL-SCNC: 3.9 MMOL/L (ref 3.5–5.2)
QT INTERVAL: 370 MS
QTC INTERVAL: 434 MS
RBC # BLD AUTO: 4.81 10*6/MM3 (ref 3.77–5.28)
RH BLD: POSITIVE
SODIUM SERPL-SCNC: 139 MMOL/L (ref 136–145)
T&S EXPIRATION DATE: NORMAL
WBC NRBC COR # BLD AUTO: 7.84 10*3/MM3 (ref 3.4–10.8)

## 2024-10-04 PROCEDURE — 86900 BLOOD TYPING SEROLOGIC ABO: CPT | Performed by: ORTHOPAEDIC SURGERY

## 2024-10-04 PROCEDURE — 86901 BLOOD TYPING SEROLOGIC RH(D): CPT | Performed by: ORTHOPAEDIC SURGERY

## 2024-10-04 PROCEDURE — 86850 RBC ANTIBODY SCREEN: CPT | Performed by: ORTHOPAEDIC SURGERY

## 2024-10-04 PROCEDURE — 93005 ELECTROCARDIOGRAM TRACING: CPT | Performed by: ORTHOPAEDIC SURGERY

## 2024-10-04 PROCEDURE — 80048 BASIC METABOLIC PNL TOTAL CA: CPT | Performed by: ORTHOPAEDIC SURGERY

## 2024-10-04 PROCEDURE — 83036 HEMOGLOBIN GLYCOSYLATED A1C: CPT | Performed by: ORTHOPAEDIC SURGERY

## 2024-10-04 PROCEDURE — 87641 MR-STAPH DNA AMP PROBE: CPT

## 2024-10-04 PROCEDURE — 36415 COLL VENOUS BLD VENIPUNCTURE: CPT

## 2024-10-04 PROCEDURE — 82040 ASSAY OF SERUM ALBUMIN: CPT

## 2024-10-04 PROCEDURE — 85025 COMPLETE CBC W/AUTO DIFF WBC: CPT | Performed by: ORTHOPAEDIC SURGERY

## 2024-10-10 LAB
QT INTERVAL: 370 MS
QTC INTERVAL: 434 MS

## 2024-10-14 ENCOUNTER — APPOINTMENT (OUTPATIENT)
Dept: GENERAL RADIOLOGY | Facility: HOSPITAL | Age: 49
End: 2024-10-14
Payer: MEDICARE

## 2024-10-14 ENCOUNTER — ANESTHESIA (OUTPATIENT)
Dept: PERIOP | Facility: HOSPITAL | Age: 49
End: 2024-10-14
Payer: MEDICARE

## 2024-10-14 ENCOUNTER — HOSPITAL ENCOUNTER (OUTPATIENT)
Facility: HOSPITAL | Age: 49
Setting detail: HOSPITAL OUTPATIENT SURGERY
Discharge: HOME | End: 2024-10-14
Attending: ORTHOPAEDIC SURGERY | Admitting: ORTHOPAEDIC SURGERY
Payer: MEDICARE

## 2024-10-14 ENCOUNTER — ANESTHESIA EVENT (OUTPATIENT)
Dept: PERIOP | Facility: HOSPITAL | Age: 49
End: 2024-10-14
Payer: MEDICARE

## 2024-10-14 VITALS
OXYGEN SATURATION: 94 % | HEART RATE: 96 BPM | BODY MASS INDEX: 34.99 KG/M2 | SYSTOLIC BLOOD PRESSURE: 147 MMHG | DIASTOLIC BLOOD PRESSURE: 98 MMHG | RESPIRATION RATE: 16 BRPM | WEIGHT: 210 LBS | HEIGHT: 65 IN | TEMPERATURE: 98 F

## 2024-10-14 DIAGNOSIS — Z96.642 STATUS POST TOTAL HIP REPLACEMENT, LEFT: Primary | ICD-10-CM

## 2024-10-14 LAB — HCG SERPL QL: NEGATIVE

## 2024-10-14 PROCEDURE — 25010000002 BUPIVACAINE IN DEXTROSE 0.75-8.25 % SOLUTION: Performed by: ANESTHESIOLOGY

## 2024-10-14 PROCEDURE — 97162 PT EVAL MOD COMPLEX 30 MIN: CPT

## 2024-10-14 PROCEDURE — 25010000002 PHENYLEPHRINE 10 MG/ML SOLUTION: Performed by: NURSE ANESTHETIST, CERTIFIED REGISTERED

## 2024-10-14 PROCEDURE — C1776 JOINT DEVICE (IMPLANTABLE): HCPCS | Performed by: ORTHOPAEDIC SURGERY

## 2024-10-14 PROCEDURE — 25010000002 GLYCOPYRROLATE 0.2 MG/ML SOLUTION: Performed by: NURSE ANESTHETIST, CERTIFIED REGISTERED

## 2024-10-14 PROCEDURE — 25010000002 KETOROLAC TROMETHAMINE PER 15 MG: Performed by: ORTHOPAEDIC SURGERY

## 2024-10-14 PROCEDURE — 25010000002 MIDAZOLAM PER 1 MG: Performed by: ANESTHESIOLOGY

## 2024-10-14 PROCEDURE — 94761 N-INVAS EAR/PLS OXIMETRY MLT: CPT

## 2024-10-14 PROCEDURE — C1713 ANCHOR/SCREW BN/BN,TIS/BN: HCPCS | Performed by: ORTHOPAEDIC SURGERY

## 2024-10-14 PROCEDURE — 94664 DEMO&/EVAL PT USE INHALER: CPT

## 2024-10-14 PROCEDURE — 25010000002 SUGAMMADEX 200 MG/2ML SOLUTION: Performed by: NURSE ANESTHETIST, CERTIFIED REGISTERED

## 2024-10-14 PROCEDURE — 25010000002 DEXAMETHASONE SODIUM PHOSPHATE 20 MG/5ML SOLUTION 5 ML VIAL: Performed by: ORTHOPAEDIC SURGERY

## 2024-10-14 PROCEDURE — 25010000002 FENTANYL CITRATE (PF) 250 MCG/5ML SOLUTION: Performed by: ANESTHESIOLOGY

## 2024-10-14 PROCEDURE — 25010000002 ROPIVACAINE PER 1 MG: Performed by: ORTHOPAEDIC SURGERY

## 2024-10-14 PROCEDURE — 73501 X-RAY EXAM HIP UNI 1 VIEW: CPT

## 2024-10-14 PROCEDURE — 76000 FLUOROSCOPY <1 HR PHYS/QHP: CPT

## 2024-10-14 PROCEDURE — 25010000002 EPINEPHRINE PER 0.1 MG: Performed by: ORTHOPAEDIC SURGERY

## 2024-10-14 PROCEDURE — 25010000002 HYDROMORPHONE 1 MG/ML SOLUTION: Performed by: NURSE ANESTHETIST, CERTIFIED REGISTERED

## 2024-10-14 PROCEDURE — 25010000002 LABETALOL 5 MG/ML SOLUTION: Performed by: NURSE ANESTHETIST, CERTIFIED REGISTERED

## 2024-10-14 PROCEDURE — 94640 AIRWAY INHALATION TREATMENT: CPT

## 2024-10-14 PROCEDURE — 25010000002 ONDANSETRON PER 1 MG: Performed by: NURSE ANESTHETIST, CERTIFIED REGISTERED

## 2024-10-14 PROCEDURE — 25010000002 PROPOFOL 10 MG/ML EMULSION: Performed by: NURSE ANESTHETIST, CERTIFIED REGISTERED

## 2024-10-14 PROCEDURE — 84703 CHORIONIC GONADOTROPIN ASSAY: CPT | Performed by: ANESTHESIOLOGY

## 2024-10-14 PROCEDURE — 25010000002 CEFAZOLIN PER 500 MG: Performed by: ORTHOPAEDIC SURGERY

## 2024-10-14 PROCEDURE — 25810000003 LACTATED RINGERS PER 1000 ML: Performed by: NURSE ANESTHETIST, CERTIFIED REGISTERED

## 2024-10-14 PROCEDURE — 25810000003 LACTATED RINGERS PER 1000 ML: Performed by: ANESTHESIOLOGY

## 2024-10-14 PROCEDURE — 25010000002 VANCOMYCIN 1 G RECONSTITUTED SOLUTION: Performed by: ORTHOPAEDIC SURGERY

## 2024-10-14 PROCEDURE — 94799 UNLISTED PULMONARY SVC/PX: CPT

## 2024-10-14 DEVICE — DEV CONTRL TISS STRATAFIX PDS PLS SZ1 VIL 18IN 45 CM: Type: IMPLANTABLE DEVICE | Site: HIP | Status: FUNCTIONAL

## 2024-10-14 DEVICE — AMISTEM C CEMENTED STEM STANDARD SIZE 0
Type: IMPLANTABLE DEVICE | Site: HIP | Status: FUNCTIONAL
Brand: AMISTEM C FEMORAL STEMS

## 2024-10-14 DEVICE — DOUBLE MOBILITY ACETABULAR SHELL Ø48
Type: IMPLANTABLE DEVICE | Site: HIP | Status: FUNCTIONAL
Brand: MPACT DOUBLE MOBILITY SHELLS

## 2024-10-14 DEVICE — TOTL HIP DBL MOBL: Type: IMPLANTABLE DEVICE | Site: HIP | Status: FUNCTIONAL

## 2024-10-14 DEVICE — CMT BONE PALACOS R HI/VISC 1X40: Type: IMPLANTABLE DEVICE | Site: HIP | Status: FUNCTIONAL

## 2024-10-14 DEVICE — HC PE LINER 28 / DMD
Type: IMPLANTABLE DEVICE | Site: HIP | Status: FUNCTIONAL
Brand: DOUBLE MOBILITY LINER

## 2024-10-14 DEVICE — BONE PREPARATION KIT
Type: IMPLANTABLE DEVICE | Site: HIP | Status: FUNCTIONAL
Brand: BIOPREP

## 2024-10-14 RX ORDER — VANCOMYCIN HYDROCHLORIDE 1 G/20ML
INJECTION, POWDER, LYOPHILIZED, FOR SOLUTION INTRAVENOUS AS NEEDED
Status: DISCONTINUED | OUTPATIENT
Start: 2024-10-14 | End: 2024-10-14 | Stop reason: HOSPADM

## 2024-10-14 RX ORDER — SODIUM CHLORIDE 0.9 % (FLUSH) 0.9 %
10 SYRINGE (ML) INJECTION AS NEEDED
Status: DISCONTINUED | OUTPATIENT
Start: 2024-10-14 | End: 2024-10-14 | Stop reason: HOSPADM

## 2024-10-14 RX ORDER — ONDANSETRON 2 MG/ML
INJECTION INTRAMUSCULAR; INTRAVENOUS AS NEEDED
Status: DISCONTINUED | OUTPATIENT
Start: 2024-10-14 | End: 2024-10-14 | Stop reason: SURG

## 2024-10-14 RX ORDER — MEPERIDINE HYDROCHLORIDE 25 MG/ML
12.5 INJECTION INTRAMUSCULAR; INTRAVENOUS; SUBCUTANEOUS
Status: DISCONTINUED | OUTPATIENT
Start: 2024-10-14 | End: 2024-10-14 | Stop reason: HOSPADM

## 2024-10-14 RX ORDER — FENTANYL CITRATE 50 UG/ML
INJECTION, SOLUTION INTRAMUSCULAR; INTRAVENOUS AS NEEDED
Status: DISCONTINUED | OUTPATIENT
Start: 2024-10-14 | End: 2024-10-14 | Stop reason: SURG

## 2024-10-14 RX ORDER — HYDRALAZINE HYDROCHLORIDE 20 MG/ML
5 INJECTION INTRAMUSCULAR; INTRAVENOUS
Status: DISCONTINUED | OUTPATIENT
Start: 2024-10-14 | End: 2024-10-14 | Stop reason: HOSPADM

## 2024-10-14 RX ORDER — TRANEXAMIC ACID 10 MG/ML
INJECTION, SOLUTION INTRAVENOUS AS NEEDED
Status: DISCONTINUED | OUTPATIENT
Start: 2024-10-14 | End: 2024-10-14 | Stop reason: SURG

## 2024-10-14 RX ORDER — HYDROCODONE BITARTRATE AND ACETAMINOPHEN 7.5; 325 MG/1; MG/1
1 TABLET ORAL EVERY 6 HOURS PRN
Qty: 28 TABLET | Refills: 0 | Status: SHIPPED | OUTPATIENT
Start: 2024-10-14

## 2024-10-14 RX ORDER — BUPIVACAINE HYDROCHLORIDE 7.5 MG/ML
INJECTION, SOLUTION INTRASPINAL
Status: COMPLETED | OUTPATIENT
Start: 2024-10-14 | End: 2024-10-14

## 2024-10-14 RX ORDER — SODIUM CHLORIDE, SODIUM LACTATE, POTASSIUM CHLORIDE, CALCIUM CHLORIDE 600; 310; 30; 20 MG/100ML; MG/100ML; MG/100ML; MG/100ML
1000 INJECTION, SOLUTION INTRAVENOUS CONTINUOUS
Status: DISPENSED | OUTPATIENT
Start: 2024-10-14 | End: 2024-10-14

## 2024-10-14 RX ORDER — ASPIRIN 81 MG/1
81 TABLET ORAL 2 TIMES DAILY
Qty: 60 TABLET | Refills: 0 | Status: SHIPPED | OUTPATIENT
Start: 2024-10-14 | End: 2024-11-13

## 2024-10-14 RX ORDER — FLUMAZENIL 0.1 MG/ML
0.1 INJECTION INTRAVENOUS AS NEEDED
Status: DISCONTINUED | OUTPATIENT
Start: 2024-10-14 | End: 2024-10-14 | Stop reason: HOSPADM

## 2024-10-14 RX ORDER — NALOXONE HCL 0.4 MG/ML
0.4 VIAL (ML) INJECTION AS NEEDED
Status: DISCONTINUED | OUTPATIENT
Start: 2024-10-14 | End: 2024-10-14 | Stop reason: HOSPADM

## 2024-10-14 RX ORDER — OXYCODONE HYDROCHLORIDE 5 MG/1
5 TABLET ORAL ONCE AS NEEDED
Status: DISCONTINUED | OUTPATIENT
Start: 2024-10-14 | End: 2024-10-14 | Stop reason: HOSPADM

## 2024-10-14 RX ORDER — CEPHALEXIN 500 MG/1
500 CAPSULE ORAL EVERY 12 HOURS
Qty: 28 CAPSULE | Refills: 0 | Status: SHIPPED | OUTPATIENT
Start: 2024-10-14

## 2024-10-14 RX ORDER — IPRATROPIUM BROMIDE AND ALBUTEROL SULFATE 2.5; .5 MG/3ML; MG/3ML
3 SOLUTION RESPIRATORY (INHALATION) ONCE AS NEEDED
Status: COMPLETED | OUTPATIENT
Start: 2024-10-14 | End: 2024-10-14

## 2024-10-14 RX ORDER — PHENYLEPHRINE HYDROCHLORIDE 10 MG/ML
INJECTION INTRAVENOUS AS NEEDED
Status: DISCONTINUED | OUTPATIENT
Start: 2024-10-14 | End: 2024-10-14 | Stop reason: SURG

## 2024-10-14 RX ORDER — TRAMADOL HYDROCHLORIDE 50 MG/1
50 TABLET ORAL EVERY 6 HOURS PRN
Qty: 28 TABLET | Refills: 0 | Status: SHIPPED | OUTPATIENT
Start: 2024-10-14

## 2024-10-14 RX ORDER — TRANEXAMIC ACID 10 MG/ML
1000 INJECTION, SOLUTION INTRAVENOUS ONCE
Status: DISCONTINUED | OUTPATIENT
Start: 2024-10-14 | End: 2024-10-14 | Stop reason: HOSPADM

## 2024-10-14 RX ORDER — ONDANSETRON 4 MG/1
4 TABLET, ORALLY DISINTEGRATING ORAL EVERY 6 HOURS PRN
Status: CANCELLED | OUTPATIENT
Start: 2024-10-14

## 2024-10-14 RX ORDER — LIDOCAINE HYDROCHLORIDE 10 MG/ML
0.5 INJECTION, SOLUTION EPIDURAL; INFILTRATION; INTRACAUDAL; PERINEURAL ONCE AS NEEDED
Status: DISCONTINUED | OUTPATIENT
Start: 2024-10-14 | End: 2024-10-14 | Stop reason: HOSPADM

## 2024-10-14 RX ORDER — DEXAMETHASONE SODIUM PHOSPHATE 4 MG/ML
20 INJECTION, SOLUTION INTRA-ARTICULAR; INTRALESIONAL; INTRAMUSCULAR; INTRAVENOUS; SOFT TISSUE ONCE
Status: DISCONTINUED | OUTPATIENT
Start: 2024-10-14 | End: 2024-10-14

## 2024-10-14 RX ORDER — MELOXICAM 15 MG/1
15 TABLET ORAL ONCE
Status: COMPLETED | OUTPATIENT
Start: 2024-10-14 | End: 2024-10-14

## 2024-10-14 RX ORDER — ROCURONIUM BROMIDE 10 MG/ML
INJECTION, SOLUTION INTRAVENOUS AS NEEDED
Status: DISCONTINUED | OUTPATIENT
Start: 2024-10-14 | End: 2024-10-14 | Stop reason: SURG

## 2024-10-14 RX ORDER — ACETAMINOPHEN 500 MG
1000 TABLET ORAL EVERY 6 HOURS
Status: CANCELLED | OUTPATIENT
Start: 2024-10-14

## 2024-10-14 RX ORDER — GLYCOPYRROLATE 0.2 MG/ML
INJECTION INTRAMUSCULAR; INTRAVENOUS AS NEEDED
Status: DISCONTINUED | OUTPATIENT
Start: 2024-10-14 | End: 2024-10-14 | Stop reason: SURG

## 2024-10-14 RX ORDER — EPHEDRINE SULFATE 5 MG/ML
5 INJECTION INTRAVENOUS ONCE AS NEEDED
Status: DISCONTINUED | OUTPATIENT
Start: 2024-10-14 | End: 2024-10-14 | Stop reason: HOSPADM

## 2024-10-14 RX ORDER — SODIUM CHLORIDE, SODIUM LACTATE, POTASSIUM CHLORIDE, CALCIUM CHLORIDE 600; 310; 30; 20 MG/100ML; MG/100ML; MG/100ML; MG/100ML
INJECTION, SOLUTION INTRAVENOUS CONTINUOUS PRN
Status: DISCONTINUED | OUTPATIENT
Start: 2024-10-14 | End: 2024-10-14 | Stop reason: SURG

## 2024-10-14 RX ORDER — DIPHENHYDRAMINE HYDROCHLORIDE 50 MG/ML
12.5 INJECTION INTRAMUSCULAR; INTRAVENOUS ONCE AS NEEDED
Status: DISCONTINUED | OUTPATIENT
Start: 2024-10-14 | End: 2024-10-14 | Stop reason: HOSPADM

## 2024-10-14 RX ORDER — ONDANSETRON 2 MG/ML
4 INJECTION INTRAMUSCULAR; INTRAVENOUS EVERY 6 HOURS PRN
Status: CANCELLED | OUTPATIENT
Start: 2024-10-14

## 2024-10-14 RX ORDER — DIPHENHYDRAMINE HYDROCHLORIDE 50 MG/ML
12.5 INJECTION INTRAMUSCULAR; INTRAVENOUS
Status: DISCONTINUED | OUTPATIENT
Start: 2024-10-14 | End: 2024-10-14 | Stop reason: HOSPADM

## 2024-10-14 RX ORDER — ACETAMINOPHEN 500 MG
1000 TABLET ORAL ONCE
Status: COMPLETED | OUTPATIENT
Start: 2024-10-14 | End: 2024-10-14

## 2024-10-14 RX ORDER — MIDAZOLAM HYDROCHLORIDE 1 MG/ML
INJECTION INTRAMUSCULAR; INTRAVENOUS AS NEEDED
Status: DISCONTINUED | OUTPATIENT
Start: 2024-10-14 | End: 2024-10-14 | Stop reason: SURG

## 2024-10-14 RX ORDER — FENTANYL CITRATE 50 UG/ML
INJECTION, SOLUTION INTRAMUSCULAR; INTRAVENOUS
Status: COMPLETED | OUTPATIENT
Start: 2024-10-14 | End: 2024-10-14

## 2024-10-14 RX ORDER — SCOLOPAMINE TRANSDERMAL SYSTEM 1 MG/1
1 PATCH, EXTENDED RELEASE TRANSDERMAL
Status: DISCONTINUED | OUTPATIENT
Start: 2024-10-14 | End: 2024-10-14 | Stop reason: HOSPADM

## 2024-10-14 RX ORDER — OXYCODONE HYDROCHLORIDE 5 MG/1
10 TABLET ORAL EVERY 4 HOURS PRN
Status: DISCONTINUED | OUTPATIENT
Start: 2024-10-14 | End: 2024-10-14 | Stop reason: HOSPADM

## 2024-10-14 RX ORDER — LABETALOL HYDROCHLORIDE 5 MG/ML
5 INJECTION, SOLUTION INTRAVENOUS
Status: DISCONTINUED | OUTPATIENT
Start: 2024-10-14 | End: 2024-10-14 | Stop reason: HOSPADM

## 2024-10-14 RX ORDER — ONDANSETRON 2 MG/ML
4 INJECTION INTRAMUSCULAR; INTRAVENOUS ONCE AS NEEDED
Status: COMPLETED | OUTPATIENT
Start: 2024-10-14 | End: 2024-10-14

## 2024-10-14 RX ADMIN — ACETAMINOPHEN 1000 MG: 500 TABLET, FILM COATED ORAL at 09:20

## 2024-10-14 RX ADMIN — SCOPOLAMINE 1 PATCH: 1.5 PATCH, EXTENDED RELEASE TRANSDERMAL at 16:37

## 2024-10-14 RX ADMIN — SODIUM CHLORIDE 2000 MG: 900 INJECTION INTRAVENOUS at 09:53

## 2024-10-14 RX ADMIN — FENTANYL CITRATE 50 MCG: 50 INJECTION, SOLUTION INTRAMUSCULAR; INTRAVENOUS at 11:35

## 2024-10-14 RX ADMIN — LABETALOL HYDROCHLORIDE 5 MG: 5 INJECTION, SOLUTION INTRAVENOUS at 12:46

## 2024-10-14 RX ADMIN — ROCURONIUM BROMIDE 20 MG: 10 INJECTION, SOLUTION INTRAVENOUS at 10:49

## 2024-10-14 RX ADMIN — SODIUM CHLORIDE, SODIUM LACTATE, POTASSIUM CHLORIDE, AND CALCIUM CHLORIDE: .6; .31; .03; .02 INJECTION, SOLUTION INTRAVENOUS at 09:58

## 2024-10-14 RX ADMIN — GLYCOPYRROLATE 0.1 MG: 0.2 INJECTION, SOLUTION INTRAMUSCULAR; INTRAVENOUS at 10:02

## 2024-10-14 RX ADMIN — SODIUM CHLORIDE, POTASSIUM CHLORIDE, SODIUM LACTATE AND CALCIUM CHLORIDE 1000 ML: 600; 310; 30; 20 INJECTION, SOLUTION INTRAVENOUS at 09:28

## 2024-10-14 RX ADMIN — DEXAMETHASONE SODIUM PHOSPHATE 20 MG: 4 INJECTION, SOLUTION INTRAMUSCULAR; INTRAVENOUS at 09:49

## 2024-10-14 RX ADMIN — ONDANSETRON 4 MG: 2 INJECTION, SOLUTION INTRAMUSCULAR; INTRAVENOUS at 16:12

## 2024-10-14 RX ADMIN — ONDANSETRON 4 MG: 2 INJECTION, SOLUTION INTRAMUSCULAR; INTRAVENOUS at 10:03

## 2024-10-14 RX ADMIN — OXYCODONE 10 MG: 5 TABLET ORAL at 12:30

## 2024-10-14 RX ADMIN — MELOXICAM 15 MG: 15 TABLET ORAL at 09:20

## 2024-10-14 RX ADMIN — HYDROMORPHONE HYDROCHLORIDE 0.5 MG: 1 INJECTION, SOLUTION INTRAMUSCULAR; INTRAVENOUS; SUBCUTANEOUS at 12:30

## 2024-10-14 RX ADMIN — TRANEXAMIC ACID 1000 MG: 10 INJECTION, SOLUTION INTRAVENOUS at 11:05

## 2024-10-14 RX ADMIN — PROPOFOL INJECTABLE EMULSION 150 MCG/KG/MIN: 10 INJECTION, EMULSION INTRAVENOUS at 10:11

## 2024-10-14 RX ADMIN — IPRATROPIUM BROMIDE AND ALBUTEROL SULFATE 3 ML: .5; 3 SOLUTION RESPIRATORY (INHALATION) at 14:08

## 2024-10-14 RX ADMIN — PHENYLEPHRINE HYDROCHLORIDE 100 MCG: 10 INJECTION INTRAVENOUS at 11:01

## 2024-10-14 RX ADMIN — HYDROMORPHONE HYDROCHLORIDE 0.5 MG: 1 INJECTION, SOLUTION INTRAMUSCULAR; INTRAVENOUS; SUBCUTANEOUS at 12:48

## 2024-10-14 RX ADMIN — SUGAMMADEX 200 MG: 100 INJECTION, SOLUTION INTRAVENOUS at 10:37

## 2024-10-14 RX ADMIN — BUPIVACAINE HYDROCHLORIDE IN DEXTROSE 1 ML: 7.5 INJECTION, SOLUTION SUBARACHNOID at 09:48

## 2024-10-14 RX ADMIN — MIDAZOLAM 2 MG: 1 INJECTION INTRAMUSCULAR; INTRAVENOUS at 09:43

## 2024-10-14 RX ADMIN — PHENYLEPHRINE HYDROCHLORIDE 100 MCG: 10 INJECTION INTRAVENOUS at 11:31

## 2024-10-14 RX ADMIN — FENTANYL CITRATE 50 MCG: 50 INJECTION, SOLUTION INTRAMUSCULAR; INTRAVENOUS at 10:03

## 2024-10-14 RX ADMIN — FENTANYL CITRATE 85 MCG: 50 INJECTION, SOLUTION INTRAMUSCULAR; INTRAVENOUS at 09:43

## 2024-10-14 RX ADMIN — FENTANYL CITRATE 15 MCG: 50 INJECTION, SOLUTION INTRAMUSCULAR; INTRAVENOUS at 09:48

## 2024-10-14 NOTE — H&P
"   History & Physical       [facility]    Proposed Surgery and date: Procedure(s) (LRB):  TOTAL HIP ARTHROPLASTY ANTERIOR WITH HANA TABLE (Left)     YOB: 1975    Medical Record Number: [id number]  Wt Readings from Last 3 Encounters:   03/11/24 109 kg (240 lb)   03/05/24 107 kg (236 lb)     Ht Readings from Last 3 Encounters:   03/11/24 162.6 cm (64\")   03/05/24 162.6 cm (64\")     Body mass index is 40.85 kg/m².  Facility age limit for growth %monica is 20 years.      Surgeon:  Dr. Laurent Diana M.D.        Chief Complaints: Pain    History of Present Illness: 49 y.o. female presents with left hip osteoarthritis. Onset of symptoms was years ago and has been progressively worsening despite more conservative treatment measures.  Symptoms are associated with ability to move, exercise, and perform activities of daily living.  Symptoms are aggravated by weight bearing and ROM necessary for activities of daily living.   Symptoms improve with rest, ice and elevation only minimally.      Allergies:   Allergies   Allergen Reactions    Lactose GI Intolerance     UPSET STOMACH. DIARRHEA       Medications:   Home Medications:  No current facility-administered medications on file prior to encounter.     Current Outpatient Medications on File Prior to Encounter   Medication Sig    albuterol sulfate  (90 Base) MCG/ACT inhaler Inhale 2 puffs Every 4 (Four) Hours As Needed for Wheezing.    amLODIPine (NORVASC) 10 MG tablet Take 1 tablet by mouth Daily.    buPROPion XL (WELLBUTRIN XL) 150 MG 24 hr tablet Take 3 tablets by mouth Daily.    celecoxib (CeleBREX) 100 MG capsule Take 1 capsule by mouth 2 (Two) Times a Day.    clonazePAM (KlonoPIN) 0.5 MG tablet Take 1 tablet by mouth 2 (Two) Times a Day As Needed for Anxiety.    cloNIDine (CATAPRES-TTS) 0.2 MG/24HR patch Place 1 patch on the skin as directed by provider 1 (One) Time Per Week. Sundays    escitalopram (LEXAPRO) 20 MG tablet Take 1 " tablet by mouth Daily.    gabapentin (NEURONTIN) 400 MG capsule Take 1 capsule by mouth 3 (Three) Times a Day.    HYDROcodone-acetaminophen (Norco) 7.5-325 MG per tablet Take 1 tablet by mouth Every 6 (Six) Hours As Needed for Moderate Pain.    lamoTRIgine (LaMICtal) 100 MG tablet Take 1 tablet by mouth Every Night.    lisinopril (PRINIVIL,ZESTRIL) 20 MG tablet Take 1 tablet by mouth Daily. Hold 24 hours    meclizine (ANTIVERT) 25 MG tablet Take 1 tablet by mouth 3 (Three) Times a Day As Needed.    pantoprazole (PROTONIX) 40 MG EC tablet Take 1 tablet by mouth Daily.    promethazine (PHENERGAN) 25 MG tablet Take 1 tablet by mouth Every 6 (Six) Hours As Needed for Nausea or Vomiting.    rosuvastatin (CRESTOR) 20 MG tablet Take 1 tablet by mouth Daily.    SUMAtriptan (IMITREX) 50 MG tablet Take 1 tablet by mouth Every 2 (Two) Hours As Needed for Migraine. Take one tablet at onset of headache. May repeat dose one time in 2 hours if headache not relieved.    tiZANidine (ZANAFLEX) 4 MG tablet Take 1 tablet by mouth Every 8 (Eight) Hours As Needed for Muscle Spasms.    traMADol (ULTRAM) 50 MG tablet Take 1 tablet by mouth Every 6 (Six) Hours As Needed for Moderate Pain.    traZODone (DESYREL) 50 MG tablet Take 1 tablet by mouth Every Night.    Vitamin D, Cholecalciferol, (CHOLECALCIFEROL) 10 MCG (400 UNIT) tablet Take 1 tablet by mouth Daily.    Zinc Sulfate (ZINC 15 PO) Take  by mouth. Hold for surgery    lidocaine (LIDODERM) 5 % Place 1 patch on the skin as directed by provider Daily. Remove & Discard patch within 12 hours or as directed by MD    ondansetron (ZOFRAN) 4 MG tablet Take 1 tablet by mouth Every 8 (Eight) Hours As Needed for Nausea or Vomiting.     Current Medications:  Scheduled Meds:  Continuous Infusions:No current facility-administered medications for this encounter.    PRN Meds:.    Past Medical History:   Diagnosis Date    Anxiety     Depression     H/O:      HTN (hypertension)     Hx of  laparoscopic gastric banding     Hyperlipidemia     Knee pain, bilateral     Migraines     PONV (postoperative nausea and vomiting)     Sleep apnea     cpap        Past Surgical History:   Procedure Laterality Date    BACK SURGERY      lumbar    BREAST BIOPSY       SECTION      x 3    CHOLECYSTECTOMY      GASTRIC BANDING      TONSILLECTOMY      TOTAL HIP ARTHROPLASTY Right 3/11/2024    Procedure: TOTAL HIP ARTHROPLASTY ANTERIOR;  Surgeon: Laurent Diana MD;  Location: [facility] MAIN OR;  Service: Orthopedics;  Laterality: Right;        Social History     Occupational History    Not on file   Tobacco Use    Smoking status: Never    Smokeless tobacco: Never   Substance and Sexual Activity    Alcohol use: Yes     Comment: social    Drug use: Never    Sexual activity: Defer      Social History     Social History Narrative    Not on file      History reviewed. No pertinent family history.      Review of Systems: 14 point review of systems was performed and was negative except as documented in the history of present illness.      Physical Exam: 49 y.o. female    Vital signs reviewed.    General Appearance:    Alert, cooperative, in no acute distress                  General: alert, oriented  Eyes: conjunctiva clear  ENT: external ears and nose atraumatic  CV: no peripheral edema  Resp: normal respiratory effort  Skin: no rashes or wounds; normal turgor  Psych: mood and affect appropriate  Lymph: no nodes appreciated  Neuro: gross sensation intact  Vascular:  Palpable peripheral pulse in noted extremity  Musculoskeletal Extremities:  tenderness over operative extremity. Moves all extremities well, no to minimal LE edema, no cyanosis, no redness            Diagnostic Tests:  Lab Results (last 7 days)       ** No results found for the last 168 hours. **            Radiology images/reports reviewed      Assessment: Left hip osteoarthritis    Plan:  We will plan on proceeding with surgery at patient's request.   Lebron has reviewed details of procedure with patient today and discussed risks, benefits, alternatives, and limitations of the procedure in laymen's terms with the risks including but not limited to: Allergy to medication, allergy to implant, device recall, leg length discrepancy, dislocation,  need to stop the surgery early or change to a different procedure, stiffness requiring revision surgeries neurovascular damage, foot drop, bleeding, infection, chronic pain, worsening of pain, chondrolysis, recurrent problem,  swelling, loss of motion, weakness, stiffness, instability, DVT, pulmonary embolus, death, stroke, complex regional pain syndrome, and need for additional procedures.  No guarantees were given regarding results of surgery.     Patient was given the opportunity to ask and have all questions answered today.  The patient voiced understanding of the risks, benefits, and alternative forms of treatment that were discussed and the patient consents to proceed with surgery.     Discharge Plan: Home with f/u in with Dr. Diana  Date: 10/14/2024  Laurent Diana MD

## 2024-10-14 NOTE — PLAN OF CARE
Goal Outcome Evaluation:  Plan of Care Reviewed With: patient, spouse           Outcome Evaluation: 48 y/o female s/p ant L THR on 10/14 due to bone spurs. Pmh includes ant R THR early this year, lumbar spine sx. Patient resides with spouse and normally uses cane for mobility. At time of eval, patient drowsy  and reporting of continued numbness aroun erasmo area. Patient able to perform supine to sit with min A. CGA to come to standing using rw for support and ambulated 80 ft x 2 with min/CGA. More assistance initially due to L knee buckling at immediate stance however gait improved with time up and patient needed CGA by end. Reviewed HEP and activity expectation . Patient will do well with home discharge and follow up PT with HH or OP per ortho MD protocol.    Anticipated Discharge Disposition (PT): home with home health, home with outpatient therapy services

## 2024-10-14 NOTE — DISCHARGE INSTRUCTIONS
"Weight bearing as tolerated with your walker.  Get up and walk with your walker every 1 to 1.5 hours during the day.  When resting, elevate legs and use ice, as needed.    Continue diet as at home.    Dr. Diana Post-Op Hip Incision Care  OZZY  You have a OZZY wound dressing in place.  This was placed under sterile conditions in the operating room.  It remains in place until your follow-up appointment.  The drain will operate with slight suction for 7 days.  After 7 days, it will stop working automatically.  At that time, remove the batteries from the battery pack and then discard the battery pack in the trash.  Cover the end of the tubing with plastic wrap and tape it to the dressing.  Keep it this way until your follow-up appointment.  Showering is ok after surgery.  Pause the battery pack (push the large orange button) and unscrew the battery pack from the tubing.  Place plastic wrap or tape over the end of the tubing.  Shower, attempting to keep the dressing out of the stream of water.  When finished, gently pat the dressing and tubing dry, remove plastic wrap or tape from end of tubing, and reattach tubing to the battery pack.  Then press the large orange button.  You will feel the battery pack vibrate until it achieves suction.  Then it will flash green over the \"ok\" button and run as usual.    Your surgical dressing will stay on for 3 days.  On post-op day 3 (Thursday), remove the gauze and telfa dressings.  You will then see clear adhesive strips on each side of your incision with “zip ties” across the incision.  This will stay on until your follow-up appointment.  No tub baths/soaking the hip until the incision is completely healed.  But you may shower starting Thursday.  Try to keep your hip out of the stream of water, if possible.  Carefully pat it dry after the shower.  You do not need to have a dressing over it, but if you prefer one, you can use gauze or a telfa (nonstick) dressing over the top.      "

## 2024-10-14 NOTE — DISCHARGE SUMMARY
[facility]      YOB: 1975    Medical Record Number: [id number]    Attending Physician: Laurent Diana MD  Consulting Physician(s):   Date of Admission: 10/14/2024  8:32 AM  Date of Discharge:       There is no problem list on file for this patient.    Status Post: AL ARTHRP ACETBLR/PROX FEM PROSTC AGRFT/ALGRFT [71654] (TOTAL HIP ARTHROPLASTY ANTERIOR WITH HANA TABLE)      Allergies   Allergen Reactions    Lactose GI Intolerance     UPSET STOMACH. DIARRHEA       Current Medications:     Discharge Medications        New Medications        Instructions Start Date   aspirin 81 MG EC tablet   81 mg, Oral, 2 Times Daily      cephalexin 500 MG capsule  Commonly known as: KEFLEX   500 mg, Oral, Every 12 Hours             Continue These Medications        Instructions Start Date   albuterol sulfate  (90 Base) MCG/ACT inhaler  Commonly known as: PROVENTIL HFA;VENTOLIN HFA;PROAIR HFA   2 puffs, Inhalation, Every 4 Hours PRN      amLODIPine 10 MG tablet  Commonly known as: NORVASC   10 mg, Oral, Daily      buPROPion  MG 24 hr tablet  Commonly known as: WELLBUTRIN XL   450 mg, Oral, Daily      celecoxib 100 MG capsule  Commonly known as: CeleBREX   100 mg, Oral, 2 Times Daily      clonazePAM 0.5 MG tablet  Commonly known as: KlonoPIN   0.5 mg, Oral, 2 Times Daily PRN      cloNIDine 0.2 MG/24HR patch  Commonly known as: CATAPRES-TTS   1 patch, Transdermal, Weekly, Sundays       escitalopram 20 MG tablet  Commonly known as: LEXAPRO   20 mg, Oral, Daily      gabapentin 400 MG capsule  Commonly known as: NEURONTIN   400 mg, Oral, 3 Times Daily      HYDROcodone-acetaminophen 7.5-325 MG per tablet  Commonly known as: NORCO   1 tablet, Oral, Every 6 Hours PRN      lamoTRIgine 100 MG tablet  Commonly known as: LaMICtal   100 mg, Oral, Nightly      lidocaine 5 %  Commonly known as: LIDODERM   1 patch, Transdermal, Every 24 Hours, Remove & Discard patch within 12 hours or as  directed by MD      lisinopril 20 MG tablet  Commonly known as: PRINIVIL,ZESTRIL   20 mg, Oral, Daily, Hold 24 hours       meclizine 25 MG tablet  Commonly known as: ANTIVERT   25 mg, Oral, 3 Times Daily PRN      ondansetron 4 MG tablet  Commonly known as: ZOFRAN   4 mg, Every 8 Hours PRN      pantoprazole 40 MG EC tablet  Commonly known as: PROTONIX   40 mg, Oral, Daily      promethazine 25 MG tablet  Commonly known as: PHENERGAN   25 mg, Oral, Every 6 Hours PRN      rosuvastatin 20 MG tablet  Commonly known as: CRESTOR   20 mg, Oral, Daily      SUMAtriptan 50 MG tablet  Commonly known as: IMITREX   50 mg, Oral, Every 2 Hours PRN, Take one tablet at onset of headache. May repeat dose one time in 2 hours if headache not relieved.      tiZANidine 4 MG tablet  Commonly known as: ZANAFLEX   4 mg, Oral, Every 8 Hours PRN      traMADol 50 MG tablet  Commonly known as: ULTRAM   50 mg, Oral, Every 6 Hours PRN      traZODone 50 MG tablet  Commonly known as: DESYREL   50 mg, Oral, Nightly      Vitamin D (Cholecalciferol) 10 MCG (400 UNIT) tablet  Commonly known as: CHOLECALCIFEROL   400 Units, Oral, Daily      ZINC 15 PO   Oral, Hold for surgery                    Past Medical History:   Diagnosis Date    Anxiety     Depression     H/O:      HTN (hypertension)     Hx of laparoscopic gastric banding     Hyperlipidemia     Knee pain, bilateral     Migraines     PONV (postoperative nausea and vomiting)     Sleep apnea     cpap     Past Surgical History:   Procedure Laterality Date    BACK SURGERY      lumbar    BREAST BIOPSY       SECTION      x 3    CHOLECYSTECTOMY      GASTRIC BANDING      TONSILLECTOMY      TOTAL HIP ARTHROPLASTY Right 3/11/2024    Procedure: TOTAL HIP ARTHROPLASTY ANTERIOR;  Surgeon: Laurent Diana MD;  Location: [facility] MAIN OR;  Service: Orthopedics;  Laterality: Right;     Social History     Occupational History    Not on file   Tobacco Use    Smoking status: Never    Smokeless  "tobacco: Never   Substance and Sexual Activity    Alcohol use: Yes     Comment: social    Drug use: Never    Sexual activity: Defer      Social History     Social History Narrative    Not on file     History reviewed. No pertinent family history.      Physical Exam: 49 y.o. female  General Appearance:    Alert, cooperative, in no acute distress                      Vitals:    10/02/24 1435   Weight: 108 kg (238 lb)   Height: 162.6 cm (64\")        Head:    Normocephalic, without obvious abnormality, atraumatic   Eyes:            Lids and lashes normal, conjunctivae and sclerae normal, no   icterus, no pallor, corneas clear, PERRLA   Ears:    Ears appear intact with no abnormalities noted   Throat:   No oral lesions, no thrush, oral mucosa moist   Neck:   No adenopathy, supple, trachea midline, no thyromegaly, no    carotid bruit, no JVD   Back:     No kyphosis present, no scoliosis present, no skin lesions,       erythema or scars, no tenderness to percussion or                   palpation,   range of motion normal   Lungs:     Clear to auscultation,respirations regular, even and                   unlabored    Heart:    Regular rhythm and normal rate, normal S1 and S2, no            murmur, no gallop, no rub, no click   Chest Wall:    No abnormalities observed   Abdomen:     Normal bowel sounds, no masses, no organomegaly, soft        non-tender, non-distended, no guarding, no rebound                 tenderness   Rectal:     Deferred   Extremities:   Incision intact without signs or symptoms of infection.               Neurovascular status remains intact to operative extremity.      Moves all extremities well, no edema, no cyanosis, no              redness   Pulses:   Pulses palpable and equal bilaterally   Skin:   No bleeding, bruising or rash   Lymph nodes:   No palpable adenopathy   Neurologic:   Cranial nerves 2 - 12 grossly intact, sensation intact, DTR        present and equal bilaterally           Hospital " Course:  49 y.o. female admitted to [facility] to services of Laurent Diana MD with Osteoarthritis [M19.90] on 10/14/2024 and underwent RI ARTHRP ACETBLR/PROX FEM PROSTC AGRFT/ALGRFT [80374] (TOTAL HIP ARTHROPLASTY ANTERIOR WITH HANA TABLE)  Per Laurent Diana MD. Antibiotic and VTE prophylaxis were per [facility] protocols. Post-operatively the patient transferred to the post-operative floor where the patient underwent mobilization therapy that included active as well as passive ROM exercises. Opioids were titrated to achieve appropriate pain management to allow for participation in mobilization exercises. Vital signs are now stable. The incision is intact without signs or symptoms of infection. Operative extremity neurovascular status remains intact.   Appropriate education re: incision care, activity levels, medications, and follow up visits was completed and all questions were answered. The patient is now deemed stable for discharge to Home.      DIAGNOSTIC TESTS:     Admission on 10/14/2024   Component Date Value Ref Range Status    Hemoglobin A1C 10/04/2024 6.00 (H)  4.80 - 5.60 % Final    ABO Type 10/04/2024 O   Final    RH type 10/04/2024 Positive   Final    Antibody Screen 10/04/2024 Negative   Final    T&S Expiration Date 10/04/2024 10/16/2024 11:59:00 PM   Final    Glucose 10/04/2024 95  65 - 99 mg/dL Final    BUN 10/04/2024 8  6 - 20 mg/dL Final    Creatinine 10/04/2024 0.91  0.57 - 1.00 mg/dL Final    Sodium 10/04/2024 139  136 - 145 mmol/L Final    Potassium 10/04/2024 3.9  3.5 - 5.2 mmol/L Final    Chloride 10/04/2024 104  98 - 107 mmol/L Final    CO2 10/04/2024 27.0  22.0 - 29.0 mmol/L Final    Calcium 10/04/2024 9.0  8.6 - 10.5 mg/dL Final    BUN/Creatinine Ratio 10/04/2024 8.8  7.0 - 25.0 Final    Anion Gap 10/04/2024 8.0  5.0 - 15.0 mmol/L Final    eGFR 10/04/2024 77.0  >60.0 mL/min/1.73 Final    WBC 10/04/2024 7.84  3.40 - 10.80 10*3/mm3 Final    RBC 10/04/2024 4.81  3.77 - 5.28  10*6/mm3 Final    Hemoglobin 10/04/2024 12.8  12.0 - 15.9 g/dL Final    Hematocrit 10/04/2024 40.6  34.0 - 46.6 % Final    MCV 10/04/2024 84.4  79.0 - 97.0 fL Final    MCH 10/04/2024 26.6  26.6 - 33.0 pg Final    MCHC 10/04/2024 31.5  31.5 - 35.7 g/dL Final    RDW 10/04/2024 13.0  12.3 - 15.4 % Final    RDW-SD 10/04/2024 39.8  37.0 - 54.0 fl Final    MPV 10/04/2024 9.8  6.0 - 12.0 fL Final    Platelets 10/04/2024 254  140 - 450 10*3/mm3 Final    Neutrophil % 10/04/2024 48.5  42.7 - 76.0 % Final    Lymphocyte % 10/04/2024 40.9  19.6 - 45.3 % Final    Monocyte % 10/04/2024 7.4  5.0 - 12.0 % Final    Eosinophil % 10/04/2024 2.0  0.3 - 6.2 % Final    Basophil % 10/04/2024 0.8  0.0 - 1.5 % Final    Immature Grans % 10/04/2024 0.4  0.0 - 0.5 % Final    Neutrophils, Absolute 10/04/2024 3.80  1.70 - 7.00 10*3/mm3 Final    Lymphocytes, Absolute 10/04/2024 3.21 (H)  0.70 - 3.10 10*3/mm3 Final    Monocytes, Absolute 10/04/2024 0.58  0.10 - 0.90 10*3/mm3 Final    Eosinophils, Absolute 10/04/2024 0.16  0.00 - 0.40 10*3/mm3 Final    Basophils, Absolute 10/04/2024 0.06  0.00 - 0.20 10*3/mm3 Final    Immature Grans, Absolute 10/04/2024 0.03  0.00 - 0.05 10*3/mm3 Final    nRBC 10/04/2024 0.0  0.0 - 0.2 /100 WBC Final       No results found.    Discharge and Follow up Instructions:     Follow up in 2 weeks    Date: 10/14/2024    Estelle España, APRN

## 2024-10-14 NOTE — ANESTHESIA POSTPROCEDURE EVALUATION
Patient: [facility]    Procedure Summary       Date: 10/14/24 Room / Location: [facility] OR 12 / [facility] MAIN OR    Anesthesia Start: 0958 Anesthesia Stop: 1206    Procedure: TOTAL HIP ARTHROPLASTY ANTERIOR WITH HANA TABLE (Left: Hip) Diagnosis:       Osteoarthritis      (Osteoarthritis [M19.90])    Surgeons: Laurent Diana MD Provider: Radha Arceo MD    Anesthesia Type: spinal ASA Status: 3            Anesthesia Type: spinal    Vitals  Vitals Value Taken Time   /88 10/14/24 1208   Temp 97.7 °F (36.5 °C) 10/14/24 1200   Pulse 104 10/14/24 1212   Resp 15 10/14/24 1205   SpO2 97 % 10/14/24 1212   Vitals shown include unfiled device data.        Post Anesthesia Care and Evaluation    Patient location during evaluation: PACU  Patient participation: complete - patient participated  Level of consciousness: awake and alert  Pain management: satisfactory to patient    Airway patency: patent  Anesthetic complications: No anesthetic complications  PONV Status: none  Cardiovascular status: acceptable  Respiratory status: acceptable  Hydration status: acceptable

## 2024-10-14 NOTE — ANESTHESIA PROCEDURE NOTES
Peripheral Block      Patient reassessed immediately prior to procedure    Performed by: Radha Arceo MD

## 2024-10-14 NOTE — ANESTHESIA PREPROCEDURE EVALUATION
Anesthesia Evaluation     Patient summary reviewed and Nursing notes reviewed   history of anesthetic complications:  PONV  NPO Solid Status: > 8 hours             Airway   Mallampati: II  TM distance: >3 FB  Neck ROM: full  No difficulty expected  Dental - normal exam     Pulmonary - normal exam   (+) ,sleep apnea on CPAP  (-) not a smoker  Cardiovascular - normal exam    ECG reviewed    (+) hypertension, hyperlipidemia  (-) angina, FERNÁNDEZ      Neuro/Psych- negative ROS  GI/Hepatic/Renal/Endo    (+) obesity    Musculoskeletal (-) negative ROS        ROS comment: Several back surgeries  Abdominal  - normal exam    Bowel sounds: normal.   Substance History - negative use     OB/GYN negative ob/gyn ROS         Other - negative ROS                   Anesthesia Plan    ASA 3     spinal and MAC   total IV anesthesia  intravenous induction     Anesthetic plan, risks, benefits, and alternatives have been provided, discussed and informed consent has been obtained with: patient.    Use of blood products discussed with patient .    Plan discussed with CRNA.    CODE STATUS:

## 2024-10-14 NOTE — OP NOTE
"[facility]  YOB: 1975    DATE OF SURGERY: 10/14/2024    PREOPERATIVE DIAGNOSIS: Left hip end-stage osteoarthritis    POSTOPERATIVE DIAGNOSIS: Left hip end-stage osteoarthritis    PROCEDURE PERFORMED: Left anterior total hip replacement    SURGICAL APPROACH: Surgical Approach: Hip Direct Anterior (Smith-Gamez)     SURGEON: Laurent Diana M.D.    ASSISTANT: JOY WINKLER JESSE D    IMPLANTS: Medacta size 0 cemented stem, size 48 dual mobility cup and a size 0 ceramic head ball  Estimated Blood Loss: 150cc  Specimens : none  Complications: none    DESCRIPTION OF PROCEDURE: The patient was taken to the operating room and placed in the supine position. A sequential compression device was carefully placed on the non-operative leg. Preoperative antibiotics were administered. Surgical time out was performed. After adequate induction of anesthesia, the feet were padded and placed in the [place] table boots. The patient ws then transferred onto the [place] table and positioned appropriately. C-arm image intensification was then used to take images of the pelvis and operative hip to be used for later comparison. The hip was then prepped and draped in the usual sterile fashion.   An incision was then made starting 2 cm lateral to the ASIS heading distal and lateral at approximately 30 degrees. The subcutaneous fat was then divided down to the fascia overlying the tensor fascia rizwana (TFL) muscle. This was sharply divided staying a few cm lateral to the interval between the sartorius and the TFL muscle. This interval was then bluntly dissected. The circumflex vessels were then identified, cauterized, and divided. There was excellent hemostasis. Cobra retractors were then placed around the femoral neck capsule. The capsule was then divided using a \"T\" capsulotomy. The retractors were then placed intracapsular the the neck osteotomy was performed.  the head was removed using a " "corkscrew. There were end-stage arthritic findings.   The acetabulum was then exposed with \"number 7\" retractors. The labrum and pulvinar were excised. The starting reamer was then used to medialize the cup and then the acetabular reaming proceeded in 2mm increments. The cup was then partially seated and c-arm was used to confirm the final cup position before final seating occurred. There was excellent position and stability of the cup.     Attention was turned to the femur.Traction was removed form the hip and the leg was brought to the neutral position. The femoral elevator hook was placed. The leg was then moved to the external rotation, extension, and adduction position. Retractors were placed around the proximal femur and then the posterior capsule and conjoined tendon was the  Released. The box osteotome was then used to creat the starting hole. The femur was then prepared using the rat tail broach, followed by the chili-pepper broach and then we progressively broached up the final broach which fit nicely with excellent rotational and axial stability. The hip was then reduced and c-arm images were taken which confirmed appropriate fit and position on the implants. There were no complicating factors noted, and flouro images were taken which confirmed proper restoration of leg length and offset.  We checked anatomical landmarks and range of motion to ensure stability and adequate length and offset.  The trial components were removed, the hip was copiously irrigated and the final implants were then cemented and seated. C-arm images again confirmed appropriate anatomy restoration without complicating factors noted. The final head was then placed on a clean dry taper and the hip reduced.   The hip was then copiously irrigated.  There was excellent hemostasis.We closed the hip in multiple layers in standard fashion. Sterile dressing were applied. At the end of the case, the sponge and needle counts were reported as " being correct. There were no known complications. The patient was then transported to the recovery room.    Assistant: Estelle España APRN; Jay Coles APRN  was responsible for performing the following activities: Retraction, Suction, Irrigation, Suturing, Closing, and Placing Dressing and their skilled assistance was necessary for the success of this case.      Laurent Diana M.D.  10/14/2024

## 2024-10-14 NOTE — ANESTHESIA PROCEDURE NOTES
Spinal Block      Patient reassessed immediately prior to procedure    Patient location during procedure: pre-op  Start Time: 10/14/2024 9:43 AM  Stop Time: 10/14/2024 9:58 AM  Indication:at surgeon's request, post-op pain management and procedure for pain  Performed By  Anesthesiologist: Radha Arceo MD  Preanesthetic Checklist  Completed: patient identified, IV checked, site marked, risks and benefits discussed, surgical consent, monitors and equipment checked, pre-op evaluation and timeout performed  Spinal Block Prep:  Patient Position:sitting  Sterile Tech:cap, gloves and sterile barriers  Prep:Chloraprep  Patient Monitoring:EKG, continuous pulse oximetry and blood pressure monitoring    Spinal Block Procedure  Approach:midline  Guidance:landmark technique and palpation technique  Location:L3-L4  Needle Type:Franca  Needle Gauge:27 G  Placement of Spinal needle event:cerebrospinal fluid aspirated  Paresthesia: no  Fluid Appearance:clear  Medications: bupivacaine in dextrose (MARCAINE SPINAL) 0.75-8.25 % injection - Intrathecal   1 mL - 10/14/2024 9:48:00 AM  fentaNYL Citrate (PF) (SUBLIMAZE) injection - Intrathecal   15 mcg - 10/14/2024 9:48:00 AM   Post Assessment  Patient Tolerance:patient tolerated the procedure well with no apparent complications  Complications no

## (undated) DEVICE — GAUZE,SPONGE,FLUFF,6"X6.75",STRL,5/TRAY: Brand: MEDLINE

## (undated) DEVICE — PAD UNDERCAST WYTEX 6IN 4YD LF STRL

## (undated) DEVICE — DRP SURG U/DRP INVISISHIELD PA 48X52IN

## (undated) DEVICE — SUT VIC 2/0 CT2 27IN J269H

## (undated) DEVICE — PK TOTL HIP 50

## (undated) DEVICE — SPNG LAP PREWSH SFTPK 18X18IN STRL PK/5

## (undated) DEVICE — KT PT POSITION SUPINE HANA/PROFX TABL

## (undated) DEVICE — BIPOLAR SEALER 23-112-1 AQM 6.0: Brand: AQUAMANTYS™

## (undated) DEVICE — GLV SURG SIGNATURE ESSENTIAL PF LTX SZ7

## (undated) DEVICE — PICO 7 10CM X 20CM: Brand: PICO™ 7

## (undated) DEVICE — C-ARM: Brand: UNBRANDED

## (undated) DEVICE — ADHS SKIN PREMIERPRO EXOFIN TOPICAL HI/VISC .5ML

## (undated) DEVICE — GLV SURG SENSICARE PI PF LF 7 GRN STRL

## (undated) DEVICE — GOWN,REINFORCE,POLY,SIRUS,BREATH SLV,LG: Brand: MEDLINE

## (undated) DEVICE — ZIP 16 SURGICAL SKIN CLOSURE DEVICE, PSA: Brand: ZIP 16 SURGICAL SKIN CLOSURE DEVICE

## (undated) DEVICE — KT SURG TURNOVER 050

## (undated) DEVICE — DUAL CUT SAGITTAL BLADE

## (undated) DEVICE — GOWN,REINF,POLY,ECL,PP SLV,L,XLONG: Brand: MEDLINE

## (undated) DEVICE — CEMENT MIXING SYSTEM WITH FEMORAL BREAKWAY NOZZLE: Brand: REVOLUTION

## (undated) DEVICE — DRAPE SHEET ULTRAGARD: Brand: MEDLINE

## (undated) DEVICE — ANTIBACTERIAL UNDYED BRAIDED (POLYGLACTIN 910), SYNTHETIC ABSORBABLE SUTURE: Brand: COATED VICRYL

## (undated) DEVICE — ZIP 24 SURGICAL SKIN CLOSURE DEVICE, PSA: Brand: ZIP 24 SURGICAL SKIN CLOSURE DEVICE

## (undated) DEVICE — UNDERGLV SURG BIOGEL INDICAT PF 8 GRN

## (undated) DEVICE — GLV SURG BIOGEL LTX PF 8

## (undated) DEVICE — PENCL SMOKE/EVAC MEGADYNE TELESCP 15FT

## (undated) DEVICE — IRRIGATOR BULB ASEPTO 60CC STRL

## (undated) DEVICE — DRSNG WND BORDR/ADHS NONADHR/GZ LF 4X10IN STRL

## (undated) DEVICE — SOL IRR NACL 0.9PCT ARTHROMATIC 3000ML

## (undated) DEVICE — PAD,NON-ADHERENT,3X8,STERILE,LF,1/PK: Brand: MEDLINE

## (undated) DEVICE — SOLUTION,WATER,IRRIGATION,1000ML,STERILE: Brand: MEDLINE

## (undated) DEVICE — NEEDLE, QUINCKE, 20GX3.5": Brand: MEDLINE

## (undated) DEVICE — UNDERGLV SURG BIOGEL INDICAT PI SZ8 BLU

## (undated) DEVICE — WRP COMPR HIP SMI/COLDTHERAPY/PREM 4GELBG3HR/24 1P/U 6PK

## (undated) DEVICE — ELECTRD BLD EZ CLN STD 6.5IN

## (undated) DEVICE — TBG PENCL TELESCP MEGADYNE SMOKE EVAC 15FT

## (undated) DEVICE — DRAPE,U/ SHT,SPLIT,PLAS,STERIL: Brand: MEDLINE